# Patient Record
Sex: FEMALE | Race: WHITE
[De-identification: names, ages, dates, MRNs, and addresses within clinical notes are randomized per-mention and may not be internally consistent; named-entity substitution may affect disease eponyms.]

---

## 2018-05-23 ENCOUNTER — HOSPITAL ENCOUNTER (OUTPATIENT)
Dept: HOSPITAL 75 - RAD | Age: 48
End: 2018-05-23
Attending: NURSE PRACTITIONER
Payer: COMMERCIAL

## 2018-05-23 DIAGNOSIS — Z53.8: Primary | ICD-10-CM

## 2018-05-23 DIAGNOSIS — R53.83: ICD-10-CM

## 2018-05-26 ENCOUNTER — HOSPITAL ENCOUNTER (OUTPATIENT)
Dept: HOSPITAL 75 - RAD | Age: 48
End: 2018-05-26
Attending: NURSE PRACTITIONER
Payer: COMMERCIAL

## 2018-05-26 DIAGNOSIS — F80.81: Primary | ICD-10-CM

## 2018-05-26 DIAGNOSIS — R51: ICD-10-CM

## 2018-05-26 DIAGNOSIS — R53.83: ICD-10-CM

## 2018-05-26 PROCEDURE — 70553 MRI BRAIN STEM W/O & W/DYE: CPT

## 2018-05-26 RX ADMIN — GADOBUTROL ONE MMOL: 604.72 INJECTION INTRAVENOUS at 14:38

## 2018-05-26 NOTE — DIAGNOSTIC IMAGING REPORT
PROCEDURE: MR imaging of the brain with and without contrast.



TECHNIQUE: Multiplanar, multisequence MR imaging of the brain was

performed with and without contrast.



INDICATION: Stuttering. Chronic headaches. 



COMPARISON: None.



FINDINGS: Examination limited by motion artifact. No abnormal

intracranial signal, enhancement, restricted water diffusion or

hemosiderin deposition. Partially empty sella. Morphology is

otherwise unremarkable including the major midline structures,

posterior fossa and cerebellar pontine angle. The orbits are

negative on this nondedicated exam. No hydrocephalus or

extra-axial fluid collections. Normal intracranial flow voids.

The paranasal sinuses and mastoids are clear. Normal bone marrow

signal.



IMPRESSION: 

1. Examination limited by motion artifact.

2. Normal MRI of the brain without and with IV contrast.



Dictated by: 



  Dictated on workstation # [id number]

## 2018-09-06 ENCOUNTER — HOSPITAL ENCOUNTER (OUTPATIENT)
Dept: HOSPITAL 75 - RAD | Age: 48
End: 2018-09-06
Attending: NURSE PRACTITIONER
Payer: COMMERCIAL

## 2018-09-06 DIAGNOSIS — M81.0: Primary | ICD-10-CM

## 2018-09-06 DIAGNOSIS — M85.88: ICD-10-CM

## 2018-09-06 PROCEDURE — 77080 DXA BONE DENSITY AXIAL: CPT

## 2018-09-06 NOTE — DIAGNOSTIC IMAGING REPORT
EXAM: DEXA scan



INDICATION: Screening for osteoporosis



COMPARISON: There are no prior studies available for comparison.



FINDINGS:  

The bone mineral density in the hips and spine was measured.



The T score for the spine is -1.1. This does indicate mild

osteopenia.



The T score for the left hip is 0.1 and for the right hip -0.4.

These values are within normal limits.



IMPRESSION:

The bone mineral density in the hips is within normal limits but

there is mild osteopenia of the spine. 



Dictated by: 



  Dictated on workstation # [id number]

## 2018-10-08 ENCOUNTER — HOSPITAL ENCOUNTER (EMERGENCY)
Dept: HOSPITAL 75 - ER | Age: 48
Discharge: HOME | End: 2018-10-08
Payer: COMMERCIAL

## 2018-10-08 VITALS — DIASTOLIC BLOOD PRESSURE: 80 MMHG | SYSTOLIC BLOOD PRESSURE: 124 MMHG

## 2018-10-08 VITALS — BODY MASS INDEX: 39.27 KG/M2 | WEIGHT: 200 LBS | HEIGHT: 60 IN

## 2018-10-08 DIAGNOSIS — I48.91: ICD-10-CM

## 2018-10-08 DIAGNOSIS — K29.70: Primary | ICD-10-CM

## 2018-10-08 DIAGNOSIS — Z98.890: ICD-10-CM

## 2018-10-08 DIAGNOSIS — Z91.14: ICD-10-CM

## 2018-10-08 DIAGNOSIS — Z87.448: ICD-10-CM

## 2018-10-08 DIAGNOSIS — K21.9: ICD-10-CM

## 2018-10-08 DIAGNOSIS — Z90.710: ICD-10-CM

## 2018-10-08 LAB
ALBUMIN SERPL-MCNC: 4.3 GM/DL (ref 3.2–4.5)
ALP SERPL-CCNC: 51 U/L (ref 40–136)
ALT SERPL-CCNC: 26 U/L (ref 0–55)
BASOPHILS # BLD AUTO: 0.1 10^3/UL (ref 0–0.1)
BASOPHILS NFR BLD AUTO: 1 % (ref 0–10)
BILIRUB SERPL-MCNC: 0.4 MG/DL (ref 0.1–1)
BUN/CREAT SERPL: 8
CALCIUM SERPL-MCNC: 9.5 MG/DL (ref 8.5–10.1)
CHLORIDE SERPL-SCNC: 102 MMOL/L (ref 98–107)
CO2 SERPL-SCNC: 22 MMOL/L (ref 21–32)
CREAT SERPL-MCNC: 0.75 MG/DL (ref 0.6–1.3)
EOSINOPHIL # BLD AUTO: 0.6 10^3/UL (ref 0–0.3)
EOSINOPHIL NFR BLD AUTO: 5 % (ref 0–10)
ERYTHROCYTE [DISTWIDTH] IN BLOOD BY AUTOMATED COUNT: 15.7 % (ref 10–14.5)
GFR SERPLBLD BASED ON 1.73 SQ M-ARVRAT: > 60 ML/MIN
GLUCOSE SERPL-MCNC: 96 MG/DL (ref 70–105)
HCT VFR BLD CALC: 45 % (ref 35–52)
HGB BLD-MCNC: 15.4 G/DL (ref 11.5–16)
LIPASE SERPL-CCNC: 7 U/L (ref 8–78)
LYMPHOCYTES # BLD AUTO: 3.3 X 10^3 (ref 1–4)
LYMPHOCYTES NFR BLD AUTO: 25 % (ref 12–44)
MAGNESIUM SERPL-MCNC: 2.6 MG/DL (ref 1.8–2.4)
MANUAL DIFFERENTIAL PERFORMED BLD QL: NO
MCH RBC QN AUTO: 27 PG (ref 25–34)
MCHC RBC AUTO-ENTMCNC: 34 G/DL (ref 32–36)
MCV RBC AUTO: 80 FL (ref 80–99)
MONOCYTES # BLD AUTO: 1 X 10^3 (ref 0–1)
MONOCYTES NFR BLD AUTO: 7 % (ref 0–12)
NEUTROPHILS # BLD AUTO: 8 X 10^3 (ref 1.8–7.8)
NEUTROPHILS NFR BLD AUTO: 62 % (ref 42–75)
PLATELET # BLD: 409 10^3/UL (ref 130–400)
PMV BLD AUTO: 10.6 FL (ref 7.4–10.4)
POTASSIUM SERPL-SCNC: 4.2 MMOL/L (ref 3.6–5)
PROT SERPL-MCNC: 7.8 GM/DL (ref 6.4–8.2)
RBC # BLD AUTO: 5.65 10^6/UL (ref 4.35–5.85)
SODIUM SERPL-SCNC: 136 MMOL/L (ref 135–145)
WBC # BLD AUTO: 12.9 10^3/UL (ref 4.3–11)

## 2018-10-08 PROCEDURE — 83735 ASSAY OF MAGNESIUM: CPT

## 2018-10-08 PROCEDURE — 85025 COMPLETE CBC W/AUTO DIFF WBC: CPT

## 2018-10-08 PROCEDURE — 83690 ASSAY OF LIPASE: CPT

## 2018-10-08 PROCEDURE — 74177 CT ABD & PELVIS W/CONTRAST: CPT

## 2018-10-08 PROCEDURE — 80053 COMPREHEN METABOLIC PANEL: CPT

## 2018-10-08 PROCEDURE — 36415 COLL VENOUS BLD VENIPUNCTURE: CPT

## 2018-10-08 NOTE — XMS REPORT
Patient Summary (HL7 CCD)

 Created on: 01/10/2017



GUANACO CHEN

External Reference #: [id number]

: 1970

Sex: Female



Demographics







 Address  [address]

[place]  [address]

 

 Home Phone  [phone]

 

 Preferred Language  English

 

 Marital Status  Unknown

 

 Worship Affiliation  Unknown

 

 Race  White

 

 Ethnic Group  Not  or 





Author







 Author  NOEL KUMAR  Unknown

 

 Address  [address]

[place], [place]  [id number]



 

 Phone  [phone]







Care Team Providers







 Care Team Member Name  Role  Phone

 

 CHINO OTOOLE MD  Attphys  [phone]

 

 CHINO OTOOLE MD  Prisurg  [phone]



                                            



Vital Signs

          Unknown or Not Available.                                            
                        



Allergies

          Unknown or Not Available.                                            
                                  



Procedures

          





 Procedure        Code        Procedure Type        Date    

 

 CX CHEST 1 VIEW        006874118        SNOMED CT        2016    

 

 TSH        37187171        SNOMED CT        2016    

 

 BNP        110813996        SNOMED CT        2016    

 

 TROPONIN-I ADV        660848505        SNOMED CT        2016    

 

 COMPREHENSIVE METABOLIC PANEL        474333755        SNOMED CT        2016    

 

 CBC W/ AUTO DIFF (RFLX MAN DIFF IF IND)        7535890        SNOMED CT        
2016    

 

 ^CBC W/AUTO DIFF        0195181        SNOMED CT        2016    



                                                                               
                                                           



History of Immunizations

          Unknown or Not Available.                                            
                                  



Problems

          Unknown or Not Available.                                            
                                            



Results

          







 COMPREHENSIVE METABOLIC PANEL - Collect Date/Time: 2016 01:35     

 

 Test Name        Code        Test Result        Test Units        Test Ref 
Range    

 

 GLUCOSE        2345-7        161        MG/DL        L=70       H=100    

 

 SODIUM        2951-2        142        MEQ/L        L=135      H=148    

 

 POTASSIUM        2823-3        3.6        MEQ/L        L=3.5      H=5.3    

 

 CHLORIDE        2075-0        108        MEQ/L        L=96       H=110    

 

 CO2        2028-9        21        MEQ/L        L=22       H=29    

 

 BUN        3094-0        11        MG/DL        L=8        H=22    

 

 CREATININE        2160-0        0.8        MG/DL        L=0.6      H=1.6    

 

 SGOT/AST        1920-8        16        IU/L        L=10       H=40    

 

 SGPT/ALT        1742-6        28        IU/L        L=8        H=54    

 

 ALK PHOS        6768-6        69        IU/L        L=35       H=115    

 

 TOTAL PROTEIN        2885-2        6.7        G/DL        L=5.5      H=8.5    

 

 ALBUMIN        1751-7        4.1        G/DL        L=3.1      H=5.4    

 

 TOTAL BILI        1975-2        0.2        MG/DL        L=0.0      H=1.5    

 

 CALCIUM        90519-6        9.1        MG/DL        L=8.2      H=10.6    

 

 AGE                 46        yrs             

 

 GFR NonAA                 78                      

 

 GFR AA                 95                      

 

 eGFR                 >60        N/A             

 

 eGFR AA*                 >60        N/A             











 CBC W/ AUTO DIFF (RFLX MAN DIFF IF IND) - Collect Date/Time: 2016 01:35 
    

 

 Test Name        Code        Test Result        Test Units        Test Ref 
Range    

 

 WBC        04138-7        10.1        TH/CMM        L=4.5      H=10.8    

 

 RBC        789-8        5.08        ML/CMM        L=4.20     H=5.40    

 

 HGB        718-7        15.0        G/DL        L=12.0     H=16.0    

 

 HCT        4544-3        44.9        %        L=37.0     H=47.0    

 

 MCV                 88        FL        L=81       H=99    

 

 MCH                 29.5        PG        L=27.0     H=33.0    

 

 MCHC                 33.4        G/DL        L=31.0     H=36.0    

 

 RDW SD                 42        FL        L=36       H=50    

 

 RDW CV                 12.9        %        L=0.0      H=14.8    

 

 MPV                 10.1        FL        L=9.3      H=12.5    

 

 PLT        777-3        288        TH/CMM        L=130      H=440    

 

 NRBC#                 0.00        TH/CMM        L=0.00     H=0.00    

 

 NRBC%                 0.0        /100WBC        L=0.0      H=2.0    

 

 %NEUT                 50.5        %             

 

 %LYMP                 39.6        %             

 

 %MONO                 6.6        %             

 

 %EOS                 2.5        %             

 

 %BASO                 0.5        %             

 

 #NEUT                 5.09        TH/CMM        L=2.10     H=8.20    

 

 #LYMP                 3.98        TH/CMM        L=0.90     H=5.20    

 

 #MONO                 0.66        TH/CMM        L=0.16     H=1.00    

 

 #EOS                 0.25        TH/CMM        L=0.00     H=0.80    

 

 #BASO                 0.05        TH/CMM        L=0.00     H=0.20    

 

 MANUAL DIFF                 NOT IND        N/A             











 BNP - Collect Date/Time: 2016 01:35     

 

 Test Name        Code        Test Result        Test Units        Test Ref 
Range    

 

 BNP        93473-3        41        PG/ML        L=0        H=100    











 TROPONIN-I ADV - Collect Date/Time: 2016 01:35     

 

 Test Name        Code        Test Result        Test Units        Test Ref 
Range    

 

 TROPONIN-I AD        57838-9        <0.04        ng/mL        L=0.04     H=
0.40    











 TSH - Collect Date/Time: 2016 01:35     

 

 Test Name        Code        Test Result        Test Units        Test Ref 
Range    

 

 TSH        91609-2        1.69        mIU/L        L=0.35     H=4.94    



                                                                               
                                                 



Active Medications

          Unknown or Not Available.                                            
                        



Medications Administered During Visit

          Unknown or Not Available.                                            
                                  



Encounters

          





 Encounter Diagnosis        Diagnosis Code        Start Date    

 

 Paroxysmal atrial fibrillation         [id number]        2016    



                                                                    



Social History

          





 Smoking Status        Code        Start Date        End Date    

 

 Current some day smoker        [id number]                      



                                                                    



Patient Decision Aids

          Unknown or Not Available.                                            
              



Discharge Instructions

          







         



You were admitted to        [facility] on 2016 01:05 with a principal 
diagnosis of Paroxysmal atrial fibrillation        



You had the following tests done:                  BNP          CBC W/ AUTO 
DIFF (RFLX MAN DIFF IF IND)          COMPREHENSIVE METABOLIC PANEL          
TROPONIN-I ADV          TSH                



You were discharged from        [facility] on 2016 03:18        



Should you have any questions prior to discharge, please contact a member of 
your healthcare team. If you have left the hospital and have any questions, 
please contact your primary care physician.          



                                                          



Chief Complaint and Reason For Visit

          





 Chief Complaint        Date of Onset    

 

 PALPITATIONS             



                                                          



Function Status

          Unknown or Not Available.                                            
              



Plan of Care

          Unknown or Not Available.                                            
              



Referral/Transition of Care

          Unknown or Not Available.

## 2018-10-08 NOTE — XMS REPORT
Patient Summary (HL7 CCD)

 Created on: 2017



GUANACO DEVINE

External Reference #: [id number]

: 1970

Sex: Female



Demographics







 Address  [address]

[place]  [address]

 

 Home Phone  [phone]

 

 Preferred Language  Unknown

 

 Marital Status  Unknown

 

 Worship Affiliation  Unknown

 

 Race  White

 

 Ethnic Group  Not  or 





Author







 Author  NOEL KUMAR  Unknown

 

 Address  [address]

[place]  [address]



 

 Phone  [phone]







Care Team Providers







 Care Team Member Name  Role  Phone

 

 KRUSE, COLTON DO  Attphys  [phone]

 

 KRUSE, COLTON DO  Prisurg  [phone]



                                                                



Allergies

          Unknown or Not Available.                                            
                                            



Active Medications

          Unknown or Not Available.                                            
                                            



Problems

          Unknown or Not Available.                                            
                                            



Procedures

          





 Procedure        Code        Procedure Type        Date    

 

 CT ABD AND PELVIS W/O CONTRAST        946652848        SNOMED CT        2017    

 

 CULTURE BLOOD        84837838        SNOMED CT        2017    

 

 LACTIC ACID        1499023        SNOMED CT        2017    

 

 C REACTIVE PROTEIN        98866110        SNOMED CT        2017    

 

 UA ROUTINE C&S IF IND        695924050        SNOMED CT        2017    

 

 COMPREHENSIVE METABOLIC PANEL        251392145        SNOMED CT        2017    

 

 CBC W/ AUTO DIFF (RFLX MAN DIFF IF IND)        3062758        SNOMED CT        
2017    

 

 ^UA WITH MICRO        753608721        SNOMED CT        2017    

 

 ^CBC W/ MANUAL DIFF        73173247        SNOMED CT        2017    



                                                                               
                                                                               
          



Results

          







 COMPREHENSIVE METABOLIC PANEL - Collect Date/Time: 2017 17:25     

 

 Test Name        Code        Test Result        Test Units        Test Ref 
Range    

 

 GLUCOSE        2345-7        124        MG/DL        L=70       H=100    

 

 SODIUM        2951-2        139        MEQ/L        L=135      H=148    

 

 POTASSIUM        2823-3        4.1        MEQ/L        L=3.5      H=5.3    

 

 CHLORIDE        2075-0        104        MEQ/L        L=96       H=110    

 

 CO2        2028-9        23        MEQ/L        L=22       H=29    

 

 BUN        3094-0        8        MG/DL        L=8        H=22    

 

 CREATININE        2160-0        0.8        MG/DL        L=0.6      H=1.6    

 

 SGOT/AST        1920-8        33        IU/L        L=10       H=40    

 

 SGPT/ALT        1742-6        50        IU/L        L=8        H=54    

 

 ALK PHOS        6768-6        62        IU/L        L=35       H=115    

 

 TOTAL PROTEIN        2885-2        7.3        G/DL        L=5.5      H=8.5    

 

 ALBUMIN        1751-7        4.2        G/DL        L=3.1      H=5.4    

 

 TOTAL BILI        1975-2        0.3        MG/DL        L=0.0      H=1.5    

 

 CALCIUM        16214-9        9.1        MG/DL        L=8.2      H=10.6    

 

 AGE                 46        yrs             

 

 GFR NonAA                 78                      

 

 GFR AA                 95                      

 

 eGFR                 >60        N/A             

 

 eGFR AA*                 >60        N/A             











 CBC W/ AUTO DIFF (RFLX MAN DIFF IF IND) - Collect Date/Time: 2017 17:25 
    

 

 Test Name        Code        Test Result        Test Units        Test Ref 
Range    

 

 WBC        44054-0        5.1        TH/CMM        L=4.5      H=10.8    

 

 RBC        789-8        5.29        ML/CMM        L=4.20     H=5.40    

 

 HGB        718-7        15.3        G/DL        L=12.0     H=16.0    

 

 HCT        4544-3        46.5        %        L=37.0     H=47.0    

 

 MCV                 88        FL        L=81       H=99    

 

 MCH                 28.9        PG        L=27.0     H=33.0    

 

 MCHC                 32.9        G/DL        L=31.0     H=36.0    

 

 RDW SD                 42        FL        L=36       H=50    

 

 RDW CV                 13.1        %        L=0.0      H=14.8    

 

 MPV                 9.6        FL        L=9.3      H=12.5    

 

 PLT        777-3        197        TH/CMM        L=130      H=440    

 

 NRBC#                 0.00        TH/CMM        L=0.00     H=0.00    

 

 NRBC%                 0.0        /100WBC        L=0.0      H=2.0    

 

 %NEUT                 71.8        %             

 

 %LYMP                 18.3        %             

 

 %MONO                 8.9        %             

 

 %EOS                 0.4        %             

 

 %BASO                 0.4        %             

 

 #NEUT                 3.64        TH/CMM        L=2.10     H=8.20    

 

 #LYMP                 0.93        TH/CMM        L=0.90     H=5.20    

 

 #MONO                 0.45        TH/CMM        L=0.16     H=1.00    

 

 #EOS                 0.02        TH/CMM        L=0.00     H=0.80    

 

 #BASO                 0.02        TH/CMM        L=0.00     H=0.20    

 

 SEGS                 53        %             

 

 BANDS                 14        %             

 

 LYMPHS                 23        %             

 

 MONOS                 10        %             

 

 MANUAL DIFF                 SEE BELOW        N/A             











 UA ROUTINE C&S IF IND - Collect Date/Time: 2017 19:08     

 

 Test Name        Code        Test Result        Test Units        Test Ref 
Range    

 

 COLOR                 YELLOW        N/A        NL: YELLOW    

 

 APPEARANCE                 CLEAR        N/A        NL: CLEAR    

 

 SPEC GRAV                 1.015        N/A        NL: 1.002 - 1.022    

 

 pH                 8.0        N/A        NL: 5 - 9    

 

 PROTEIN                 NEGATIVE        N/A        NL: NEGATIVE  mg/dl    

 

 GLUCOSE                 NEGATIVE        N/A        NL: NEGATIVE  mg/dl    

 

 KETONE                 NEGATIVE        N/A        NL: NEGATIVE  mg/dl    

 

 BILIRUBIN                 NEGATIVE        N/A        NL: NEGATIVE    

 

 BLOOD                 SMALL        N/A        NL: NEGATIVE    

 

 NITRITE                 NEGATIVE        N/A        NL: NEGATIVE    

 

 LEUK SCREEN                 NEGATIVE        N/A        NL: NEGATIVE    

 

 MICRO INDICATED?                 SEE BELOW        N/A             

 

 WBC/HPF                 RARE        N/A        NL:  NEGATIVE    

 

 RBC/HPF                 10-20        N/A        NL:  NEGATIVE    

 

 CASTS/LPF                 NEGATIVE        N/A        NL:  NEGATIVE    

 

 CRYSTALS                 NEGATIVE        N/A        NL:  NEGATIVE    

 

 MUCOUS THRDS                 FEW        N/A        NL:  NEGATIVE    

 

 BACTERIA                 FEW        N/A        NL:  NEGATIVE    

 

 EPITH CELLS                 FEW SQUAMOUS        N/A        NL:  NEGATIVE    

 

 TRICHOMONAS                 NEGATIVE        N/A        NL:  NEGATIVE    

 

 YEAST                 NEGATIVE        N/A        NL:  NEGATIVE    

 

 CULT SET UP?                 NO        N/A             











 TICK PANEL - Collect Date/Time: 2017 17:25     

 

 Test Name        Code        Test Result        Test Units        Test Ref 
Range    

 

 Lyme IgG/IgM Ab        59093-2        <0.91        ISR        0.00-0.90    

 

 Lyme Disease Ab, Quant,IgM        5064-1        <0.80        index        0.00-
0.79    

 

 Octavio Mtn Spotted Fever,IgM        06081-2        0.27        index        0.00
-0.89    

 

 RMSF, IgG, EIA        41476-3        Negative        N/A        Negative    

 

 E. chaffeensis (HME) IgGTiter        82861-6        Negative        N/A        
Neg:<1:64    

 

 E. chaffeensis (HME) IgMTiter        66212-5        Negative        N/A        
Neg:<1:20    











 C REACTIVE PROTEIN - Collect Date/Time: 2017 17:25     

 

 Test Name        Code        Test Result        Test Units        Test Ref 
Range    

 

 C REACTIVE PROTEIN        -        4.8        MG/DL        L=0.0      H=
1.0    











 LACTIC ACID - Collect Date/Time: 2017 17:25     

 

 Test Name        Code        Test Result        Test Units        Test Ref 
Range    

 

 LACTIC ACID        2524-7        1.2        mmol/L        L=0.5      H=1.6    



                                                                               
                                                 



Function Status

          Unknown or Not Available.                                            
                                  



History of Immunizations

          Unknown or Not Available.                                            
                        



Plan of Treatment

          Unknown or Not Available.                                            
                        



Social History

          





 Smoking Status        Code        Start Date        End Date    

 

 Current some day smoker        [id number]                      



                                                                               
         



Vital Signs

          Unknown or Not Available.                                            
                        



Function Status

          Unknown or Not Available.                                            
    



Goals

          Unknown or Not Available.                                            
              



ASSESSMENTS

          Unknown or Not Available.                                            
                        



Health Concerns Section

          Unknown or Not Available.

## 2018-10-08 NOTE — XMS REPORT
Patient Summary (HL7 CCD)

 Created on: 2017



GUANACO CHEN

External Reference #: [id number]

: 1970

Sex: Female



Demographics







 Address  [address]

[place]  [address]

 

 Home Phone  [phone]

 

 Preferred Language  Unknown

 

 Marital Status  Unknown

 

 Gnosticism Affiliation  Unknown

 

 Race  White

 

 Ethnic Group  Not  or 





Author







 Author  DEV NAVARRETE

 

 Organization  Unknown

 

 Address  [address]

[place]  [address]



 

 Phone  [phone]







Care Team Providers







 Care Team Member Name  Role  Phone

 

 COLTON KRUSE   Attphys  [phone]

 

 CHINO OTOOLE MD  Prisurg  [phone]



                                                                



Allergies

          Unknown or Not Available.                                            
                                            



Active Medications

          Unknown or Not Available.                                            
                                            



Problems

          Unknown or Not Available.                                            
                                            



Procedures

          





 Procedure        Code        Procedure Type        Date    

 

 CT ABD AND PELVIS W/O CONTRAST        [id number]        [facility] CT        09/10/
2016    

 

 ABDOMEN ONE VIEW        [id number]        [facility] CT        09/10/2016    

 

 CX CHEST 1 VIEW        [id number]        [facility]        09/10/2016    

 

 C REACTIVE PROTEIN        [id number]        [facility] CT        09/10/2016    

 

 CULTURE BLOOD        02647182        [facility] CT        09/10/2016    

 

 LACTIC ACID        1383157        [facility]OM[facility] CT        09/10/2016    

 

 MAGNESIUM        619977248        SNOMED CT        09/10/2016    

 

 UA ROUTINE C&S IF IND        [id number]        SNOMED CT        09/10/2016    

 

 TROPONIN-I ADV        [id number]        [facility] CT        09/10/2016    

 

 COMPREHENSIVE METABOLIC PANEL        252731801        SNOMED CT        09/10/
2016    

 

 CBC W/ AUTO DIFF (RFLX MAN DIFF IF IND)        0648122        SNOMED CT        
09/10/2016    

 

 ^UA WITH MICRO        203447039        SNOMED CT        09/10/2016    

 

 ^CBC W/AUTO DIFF        6405064        SNOMED CT        09/10/2016    



                                                                               
                                                                               
                                                  



Results

          







 COMPREHENSIVE METABOLIC PANEL - Collect Date/Time: 09/10/2016 19:05     

 

 Test Name        Code        Test Result        Test Units        Test Ref 
Range    

 

 GLUCOSE        2345-7        141        MG/DL        L=70       H=100    

 

 SODIUM        2951-2        141        MEQ/L        L=135      H=148    

 

 POTASSIUM        2823-3        3.4        MEQ/L        L=3.5      H=5.3    

 

 CHLORIDE        2075-0        108        MEQ/L        L=96       H=110    

 

 CO2        2028-9        20        MEQ/L        L=22       H=29    

 

 BUN        3094-0        14        MG/DL        L=8        H=22    

 

 CREATININE        2160-0        0.8        MG/DL        L=0.6      H=1.6    

 

 SGOT/AST        1920-8        18        IU/L        L=10       H=40    

 

 SGPT/ALT        1742-6        28        IU/L        L=8        H=54    

 

 ALK PHOS        6768-6        68        IU/L        L=35       H=115    

 

 TOTAL PROTEIN        2885-2        6.8        G/DL        L=5.5      H=8.5    

 

 ALBUMIN        1751-7        4.1        G/DL        L=3.1      H=5.4    

 

 TOTAL BILI        1975-2        0.4        MG/DL        L=0.0      H=1.5    

 

 CALCIUM        88618-8        9.2        MG/DL        L=8.2      H=10.6    

 

 AGE                 46        yrs             

 

 GFR NonAA                 78                      

 

 GFR AA                 95                      

 

 eGFR                 >60        N/A             

 

 eGFR AA*                 >60        N/A             











 MAGNESIUM - Collect Date/Time: 09/10/2016 19:05     

 

 Test Name        Code        Test Result        Test Units        Test Ref 
Range    

 

 MAGNESIUM        55517-0        2.1        MG/DL        L=1.7      H=2.8    











 CBC W/ AUTO DIFF (RFLX MAN DIFF IF IND) - Collect Date/Time: 09/10/2016 19:05 
    

 

 Test Name        Code        Test Result        Test Units        Test Ref 
Range    

 

 WBC        56564-3        12.0        TH/CMM        L=4.5      H=10.8    

 

 RBC        789-8        4.99        ML/CMM        L=4.20     H=5.40    

 

 HGB        718-7        14.6        G/DL        L=12.0     H=16.0    

 

 HCT        4544-3        43.8        %        L=37.0     H=47.0    

 

 MCV                 88        FL        L=81       H=99    

 

 MCH                 29.3        PG        L=27.0     H=33.0    

 

 MCHC                 33.3        G/DL        L=31.0     H=36.0    

 

 RDW SD                 42        FL        L=36       H=50    

 

 RDW CV                 13.1        %        L=0.0      H=14.8    

 

 MPV                 10.4        FL        L=9.3      H=12.5    

 

 PLT        777-3        290        TH/CMM        L=130      H=440    

 

 NRBC#                 0.00        TH/CMM        L=0.00     H=0.00    

 

 NRBC%                 0.0        /100WBC        L=0.0      H=2.0    

 

 %NEUT                 60.3        %             

 

 %LYMP                 30.4        %             

 

 %MONO                 6.8        %             

 

 %EOS                 1.8        %             

 

 %BASO                 0.4        %             

 

 #NEUT                 7.24        TH/CMM        L=2.10     H=8.20    

 

 #LYMP                 3.65        TH/CMM        L=0.90     H=5.20    

 

 #MONO                 0.81        TH/CMM        L=0.16     H=1.00    

 

 #EOS                 0.22        TH/CMM        L=0.00     H=0.80    

 

 #BASO                 0.05        TH/CMM        L=0.00     H=0.20    

 

 MANUAL DIFF                 NOT IND        N/A             











 UA ROUTINE C&S IF IND - Collect Date/Time: 09/10/2016 19:30     

 

 Test Name        Code        Test Result        Test Units        Test Ref 
Range    

 

 COLOR                 YELLOW        N/A        NL: YELLOW    

 

 APPEARANCE                 CLEAR        N/A        NL: CLEAR    

 

 SPEC GRAV                 >=1.030        N/A        NL: 1.002 - 1.022    

 

 pH                 6.0        N/A        NL: 5 - 9    

 

 PROTEIN                 NEGATIVE        N/A        NL: NEGATIVE  mg/dl    

 

 GLUCOSE                 NEGATIVE        N/A        NL: NEGATIVE  mg/dl    

 

 KETONE                 TRACE        N/A        NL: NEGATIVE  mg/dl    

 

 BILIRUBIN                 NEGATIVE        N/A        NL: NEGATIVE    

 

 BLOOD                 MODERATE        N/A        NL: NEGATIVE    

 

 NITRITE                 NEGATIVE        N/A        NL: NEGATIVE    

 

 LEUK SCREEN                 NEGATIVE        N/A        NL: NEGATIVE    

 

 MICRO INDICATED?                 SEE BELOW        N/A             

 

 WBC/HPF                 RARE        N/A        NL:  NEGATIVE    

 

 RBC/HPF                 0-5        N/A        NL:  NEGATIVE    

 

 CASTS/LPF                 1+ HYALINE        N/A        NL:  NEGATIVE    

 

 CRYSTALS                 NEGATIVE        N/A        NL:  NEGATIVE    

 

 MUCOUS THRDS                 1+        N/A        NL:  NEGATIVE    

 

 BACTERIA                 FEW        N/A        NL:  NEGATIVE    

 

 EPITH CELLS                 FEW SQUAMOUS        N/A        NL:  NEGATIVE    

 

 TRICHOMONAS                 NEGATIVE        N/A        NL:  NEGATIVE    

 

 YEAST                 NEGATIVE        N/A        NL:  NEGATIVE    

 

 CULT SET UP?                 NO        N/A             











 C REACTIVE PROTEIN - Collect Date/Time: 09/10/2016 19:05     

 

 Test Name        Code        Test Result        Test Units        Test Ref 
Range    

 

 C REACTIVE PROTEIN        1988-5        2.0        MG/DL        L=0.0      H=
1.0    











 TROPONIN-I ADV - Collect Date/Time: 09/10/2016 19:05     

 

 Test Name        Code        Test Result        Test Units        Test Ref 
Range    

 

 TROPONIN-I AD        16126-5        <0.04        ng/mL        L=0.04     H=
0.40    











 LACTIC ACID - Collect Date/Time: 09/10/2016 19:05     

 

 Test Name        Code        Test Result        Test Units        Test Ref 
Range    

 

 LACTIC ACID        2524-7        1.7        mmol/L        L=0.5      H=1.6    



                                                                               
                                                           



Function Status

          Unknown or Not Available.                                            
                                  



History of Immunizations

          Unknown or Not Available.                                            
                        



Plan of Treatment

          Unknown or Not Available.                                            
                        



Social History

          





 Smoking Status        Code        Start Date        End Date    

 

 Current some day smoker        [id number]                      



                                                                               
         



Vital Signs

          Unknown or Not Available.                                            
                        



Function Status

          Unknown or Not Available.                                            
    



Goals

          Unknown or Not Available.                                            
              



ASSESSMENTS

          Unknown or Not Available.                                            
                        



Health Concerns Section

          Unknown or Not Available.

## 2018-10-08 NOTE — DIAGNOSTIC IMAGING REPORT
INDICATION: Abdominal pain x3 weeks with vomiting and low-grade

fever.



CT of the abdomen and pelvis obtained with IV contrast bolus.

There is no prior study for comparison.



FINDINGS: The visualized portions of the lung bases are clear.

There were no pleural fluid collections. There is no free

intraperitoneal air.



The liver shows diffuse low-density change compatible with fatty

infiltration. There is no focal liver lesion. Gallbladder appears

unremarkable. The spleen, adrenals, and pancreas are normal in

appearance. Kidneys bilaterally are unremarkable. There is no

retroperitoneal mass or adenopathy. There is no ascites or

abnormal fluid collection. Visualized bowel loops show no bowel

wall thickening or obstruction. The appendix appears normal.

There is no pelvic mass or free fluid. Patient appears to have

had prior hysterectomy.



IMPRESSION:



No abdominal mass or abnormal fluid collection. Fatty

infiltration of the liver is noted. Evidence of previous

hysterectomy. No acute process is visualized in the abdomen or

pelvis.



Dictated by: 



  Dictated on workstation # [id number]

## 2018-10-08 NOTE — ED ABDOMINAL PAIN
General


Chief Complaint:  Abdominal/GI Problems


Stated Complaint:  N/V


Nursing Triage Note:  


p states three weeks ago she began to have foul smelling belching that 


progressed into pain after eating in the RUQ. today the pt began to develope 


nausea and vomiting with the pain. upon presentation denies nausea.


Sepsis Screen:  No Definite Risk


Source of Information:  Patient


Exam Limitations:  No Limitations





History of Present Illness


Date Seen by Provider:  Oct 8, 2018


Time Seen by Provider:  10:56


Initial Comments


The patient presents to the ER by private conveyance with her  and chief 

complaint that she's been having some abdominal pain in her epigastric 

radiating to her right upper quadrant off-and-on for the past 3-4 weeks. She 

says about 4-6 weeks ago she started taking a new injectable class of 

antihyperglycemic known as GLP1 receptor agonists and she is on her third 

brand. She did not like the style of injector on the second one in the first 

one she had some issues tolerating it. She did go see her doctor on Friday and 

they did labs and urine and told her everything looked okay. She's continued to 

have nausea vomiting and worsening pain and so she was instructed to go to the 

ER if this occurred. She says her blood sugars were getting better on the 

injectables but for the past 2 days they've been high as high as 160 and an 

hour later down to 60 with sweats, cool clammy feeling. She fixed by eating 

something. She also takes metformin twice daily. She has a history of C-sections

, hysterectomy and both of her ovaries removed for large cysts. She had her 

hysterectomy done for fibroid tumors. She also has endometriosis. She has not 

had any other abdominal surgeries. She's had no fevers but she's measured her 

temperature to be as high as 99.6. She's not having any nausea presently but 

her pain is about a 7 out of 10. She's been using tramadol for her pain. She 

does not feel that this helped much. She has had a colonoscopy but she does not 

member any mention of diverticulosis. She's never had an EGD. She does have a 

history of GERD and has been using omeprazole for years and her PCP started her 

on Carafate 4 days ago. She also states she has a history of atrial 

fibrillation and is not on any blood thinners but follows with the cardiologist 

that comes down and also be go from [facility].





Allergies and Home Medications


Allergies


Coded Allergies:  


     No Known Drug Allergies (Unverified , 18)





Home Medications


Ondansetron 4 Mg Tab.rapdis, 4 MG PO Q6H PRN for NAUSEA/VOMITING


   Prescribed by: LAVONNE KAMARA on 10/8/18 1778





Patient Home Medication List


Home Medication List Reviewed:  Yes





Review of Systems


Review of Systems


Constitutional:  No chills, No diaphoresis; malaise


EENTM:  No Blurred Vision, No Double Vision


Respiratory:  Denies Cough, Denies Shortness of Air


Cardiovascular:  Denies Chest Pain, Denies Lightheadedness


Gastrointestinal:  Denies Abdomen Distended; Abdominal Pain; Denies Constipated

, Denies Diarrhea; Nausea, Vomiting


Genitourinary:  Denies Burning, Denies Discharge


Musculoskeletal:  No back pain, No joint pain


Skin:  No pruritus, No rash


Psychiatric/Neurological:  Denies Headache, Denies Numbness





Past Medical-Social-Family Hx


Patient Social History


Alcohol Use:  Denies Use


Recreational Drug Use:  No


Smoking Status:  Never a Smoker


Recent Foreign Travel:  No


Contact w/Someone Who Travel:  No


Recent Infectious Disease Expo:  No





Past Medical History


Pregnant:  No


GYN History:  Hysterectomy





Physical Exam


Vital Signs





Vital Signs - First Documented








 10/8/18





 10:51


 


Temp 98.8


 


Pulse 88


 


Resp 20


 


B/P (MAP) 149/93 (111)





Capillary Refill : Less Than 3 Seconds


Height/Weight/BMI


Height: 5'0"


Weight: 200lbs. oz. 90.237509mw;  BMI


Method:Stated


General Appearance:  WD/WN, no apparent distress


HEENT:  PERRL/EOMI, pharynx normal


Neck:  non-tender, normal inspection


Respiratory:  chest non-tender, lungs clear, normal breath sounds, no 

respiratory distress, no accessory muscle use


Cardiovascular:  normal peripheral pulses, regular rate, rhythm, no edema


Gastrointestinal:  normal bowel sounds, guarding; No rebound; tenderness (

moderate to severe with Castellano sign)





Progress/Results/Core Measures


Results/Orders


Lab Results





Laboratory Tests








Test


 10/8/18


11:28 Range/Units


 


 


White Blood Count


 12.9 H


 4.3-11.0


10^3/uL


 


Red Blood Count


 5.65 


 4.35-5.85


10^6/uL


 


Hemoglobin 15.4  11.5-16.0  G/DL


 


Hematocrit 45  35-52  %


 


Mean Corpuscular Volume 80  80-99  FL


 


Mean Corpuscular Hemoglobin 27  25-34  PG


 


Mean Corpuscular Hemoglobin


Concent 34 


 32-36  G/DL





 


Red Cell Distribution Width 15.7 H 10.0-14.5  %


 


Platelet Count


 409 H


 130-400


10^3/uL


 


Mean Platelet Volume 10.6 H 7.4-10.4  FL


 


Neutrophils (%) (Auto) 62  42-75  %


 


Lymphocytes (%) (Auto) 25  12-44  %


 


Monocytes (%) (Auto) 7  0-12  %


 


Eosinophils (%) (Auto) 5  0-10  %


 


Basophils (%) (Auto) 1  0-10  %


 


Neutrophils # (Auto) 8.0 H 1.8-7.8  X 10^3


 


Lymphocytes # (Auto) 3.3  1.0-4.0  X 10^3


 


Monocytes # (Auto) 1.0  0.0-1.0  X 10^3


 


Eosinophils # (Auto)


 0.6 H


 0.0-0.3


10^3/uL


 


Basophils # (Auto)


 0.1 


 0.0-0.1


10^3/uL


 


Sodium Level 136  135-145  MMOL/L


 


Potassium Level 4.2  3.6-5.0  MMOL/L


 


Chloride Level 102    MMOL/L


 


Carbon Dioxide Level 22  21-32  MMOL/L


 


Anion Gap 12  5-14  MMOL/L


 


Blood Urea Nitrogen 6 L 7-18  MG/DL


 


Creatinine


 0.75 


 0.60-1.30


MG/DL


 


Estimat Glomerular Filtration


Rate > 60 


  





 


BUN/Creatinine Ratio 8   


 


Glucose Level 96    MG/DL


 


Calcium Level 9.5  8.5-10.1  MG/DL


 


Corrected Calcium 9.3  8.5-10.1  MG/DL


 


Magnesium Level 2.6 H 1.8-2.4  MG/DL


 


Total Bilirubin 0.4  0.1-1.0  MG/DL


 


Aspartate Amino Transf


(AST/SGOT) 20 


 5-34  U/L





 


Alanine Aminotransferase


(ALT/SGPT) 26 


 0-55  U/L





 


Alkaline Phosphatase 51    U/L


 


Total Protein 7.8  6.4-8.2  GM/DL


 


Albumin 4.3  3.2-4.5  GM/DL


 


Lipase 7 L 8-78  U/L








My Orders





Orders - ANH,LAVONNE J


Cbc With Automated Diff (10/8/18 11:06)


Comprehensive Metabolic Panel (10/8/18 11:06)


Lipase (10/8/18 11:06)


Magnesium (10/8/18 11:06)


Saline Lock/Iv-Start (10/8/18 11:06)


Ns Iv 1000 Ml (Sodium Chloride 0.9%) (10/8/18 11:06)


Ketorolac Injection (Toradol Injection) (10/8/18 11:15)


Ct Abdomen/Pelvis W (10/8/18 11:19)


Iohexol Injection (Omnipaque 350 Mg/Ml 1 (10/8/18 11:30)


Ns (Ivpb) (Sodium Chloride 0.9%) (10/8/18 11:30)


Ondansetron Injection (Zofran Injectio (10/8/18 12:45)


Lidocaine 2% Viscous 15 Ml (Xylocaine Vi (10/8/18 12:45)


Famotidine Tablet (Pepcid Tablet) (10/8/18 12:43)


Antacid  Suspension (Mylanta  Suspension (10/8/18 12:45)





Medications Given in ED





Current Medications








 Medications  Dose


 Ordered  Sig/Viet


 Route  Start Time


 Stop Time Status Last Admin


Dose Admin


 


 Al Hydrox/Mg


 Hydrox/Simethicone  30 ml  ONCE  ONCE


 PO  10/8/18 12:45


 10/8/18 12:46 DC 10/8/18 12:57


30 ML


 


 Ketorolac


 Tromethamine  15 mg  ONCE  ONCE


 IVP  10/8/18 11:15


 10/8/18 11:16 DC 10/8/18 11:39


15 MG


 


 Lidocaine HCl  15 ml  ONCE  ONCE


 PO  10/8/18 12:45


 10/8/18 12:46 DC 10/8/18 12:57


15 ML


 


 Ondansetron HCl  4 mg  ONCE  ONCE


 IVP  10/8/18 12:45


 10/8/18 12:46 DC 10/8/18 12:57


4 MG








Vital Signs/I&O











 10/8/18





 10:51


 


Temp 98.8


 


Pulse 88


 


Resp 20


 


B/P (MAP) 149/93 (111)














Blood Pressure Mean:  111











Progress


Progress Note #1:  


   Time:  11:14


Progress Note


IV fluids, labs included lipase and magnesium, Toradol for pain to start. She's 

declining anything for nausea right now. We have offered to do a UA but she 

says she just had checked Friday and is not having any symptoms. She does have 

right upper quadrant pains were in a start with a CT with contrast of her 

abdomen and pelvis to observe the gallbladder, pancreas and colon. She does not 

have any fever or tachycardia today. The other consideration would be that her 

symptoms started shortly after using the GLP1 agonists which are known to have 

pretty prominent GI symptoms similar to what she describes. Her symptoms are 

certainly after eating but they seemed to come right after eating as opposed to 

an hour later.


Progress Note #2:  


   Time:  12:32


Progress Note


CT imaging is unrevealing. Labs are unrevealing of any pathology. Her all 3 

cell lines are high or on the upper limit of normal which could be from her 

nausea vomiting or could be from dehydration however the BUNs is not elevated. 

Clinical exam she does look a little dry. We put some fluids and antinausea 

medicine and since is nothing acutely going on with recommend she follow up 

working up her gallbladder outpatient with her primary care doctor. Would also 

recommend some nausea medicine such as Zofran instead of Phenergan during the 

day and finally if nothing else was found acutely the primary care team could 

consider the GLP1 agonists. 


Her nausea has significantly improved but she still having a little bit sore 

give another round of Zofran and a GI cocktail to help us examine the esophagus 

and stomach lining see if this is causing her pain. She says her pain has 

improved significantly as well and is almost gone but still dull and aching 

about 2-3 out of 10.


Progress Note #3:  


   Time:  13:47


Progress Note


The patient's nausea is gone and she notes that the GI cocktail made her pain 

go away completely and nearly immediately. We will recommend that she follow up 

with the PCP to discuss setting up an EGD outpatient.





Diagnostic Imaging





   Diagonstic Imaging:  CT (with contrast)


   Plain Films/CT/US/NM/MRI:  abdomen, pelvis


Comments


NAME:   GUANACO DEVINE


[facility] REC#:   [id number]


ACCOUNT#:   [id number]


PT STATUS:   REG ER


:   1970


PHYSICIAN:   LAVONNE KAMARA MD


ADMIT DATE:   10/08/18/ER


 ***Draft***


Date of Exam:10/08/18





CT ABDOMEN/PELVIS W








INDICATION: Abdominal pain x3 weeks with vomiting and low-grade


fever.





CT of the abdomen and pelvis obtained with IV contrast bolus.


There is no prior study for comparison.





FINDINGS: The visualized portions of the lung bases are clear.


There were no pleural fluid collections. There is no free


intraperitoneal air.





The liver shows diffuse low-density change compatible with fatty


infiltration. There is no focal liver lesion. Gallbladder appears


unremarkable. The spleen, adrenals, and pancreas are normal in


appearance. Kidneys bilaterally are unremarkable. There is no


retroperitoneal mass or adenopathy. There is no ascites or


abnormal fluid collection. Visualized bowel loops show no bowel


wall thickening or obstruction. The appendix appears normal.


There is no pelvic mass or free fluid. Patient appears to have


had prior hysterectomy.





IMPRESSION:





No abdominal mass or abnormal fluid collection. Fatty


infiltration of the liver is noted. Evidence of previous


hysterectomy. No acute process is visualized in the abdomen or


pelvis.





  Dictated on workstation # [id number]








Dict:   10/08/18 1222


Trans:   10/08/18 1228


 [id number]





Interpreted by:     PARMJIT DUFFY MD


Electronically signed by:


   Reviewed:  Reviewed by Me





Departure


Impression





 Primary Impression:  


 Abdominal pain


 Qualified Codes:  R10.11 - Right upper quadrant pain


 Additional Impressions:  


 Nausea vomiting and diarrhea


 Gastritis


 Qualified Codes:  K29.00 - Acute gastritis without bleeding


Disposition:   HOME, SELF-CARE


Condition:  Improved





Departure-Patient Inst.


Decision time for Depature:  13:48


Referrals:  


CHASTITY,LOCAL PHYSICIAN (PCP)


Primary Care Physician








NIRANJAN CAROLINA (Family)


Primary Care Physician


Patient Instructions:  Gastritis (DC)





Add. Discharge Instructions:  


Continue to use your omeprazole and Carafate as prescribed. Take Zantac 1 

tablet twice a day for the next 2-4 weeks in addition. If you have nausea you 

can use Zofran 1 tablet every 6 hours as needed in addition to the Phenergan.


Get a follow-up appointment with your primary care doctor in the next 1-2 weeks 

to continue working up your symptoms with an EGD.


If your pain gets worse or you develop fevers above 102.5F or you have 

intractable nausea vomiting or other worrisome symptoms then you should return 

to the nearest ER for further evaluation.


All discharge instructions reviewed with patient and/or family. Voiced 

understanding.


Scripts


Ondansetron (Ondansetron Odt) 4 Mg Tab.rapdis


4 MG PO Q6H PRN for NAUSEA/VOMITING for 14 Days, #12 TAB 0 Refills


   Prov: LAVONNE KAMARA         10/8/18


Work/School Note:  Work Release Form   Date Seen in the Emergency Department:  

Oct 8, 2018


   Return to Work:  Oct 11, 2018


   Restrictions:  No Restrictions











LAVONNE KAMARA Oct 8, 2018 11:14

## 2018-10-22 ENCOUNTER — HOSPITAL ENCOUNTER (OUTPATIENT)
Dept: HOSPITAL 75 - PREOP | Age: 48
Discharge: HOME | End: 2018-10-22
Attending: SURGERY
Payer: COMMERCIAL

## 2018-10-22 VITALS — HEIGHT: 60 IN | WEIGHT: 200 LBS | BODY MASS INDEX: 39.27 KG/M2

## 2018-10-22 DIAGNOSIS — Z01.818: Primary | ICD-10-CM

## 2018-10-24 ENCOUNTER — HOSPITAL ENCOUNTER (OUTPATIENT)
Dept: HOSPITAL 75 - ENDO | Age: 48
Discharge: HOME | End: 2018-10-24
Attending: SURGERY
Payer: COMMERCIAL

## 2018-10-24 VITALS — DIASTOLIC BLOOD PRESSURE: 86 MMHG | SYSTOLIC BLOOD PRESSURE: 130 MMHG

## 2018-10-24 VITALS — DIASTOLIC BLOOD PRESSURE: 68 MMHG | SYSTOLIC BLOOD PRESSURE: 106 MMHG

## 2018-10-24 VITALS — SYSTOLIC BLOOD PRESSURE: 137 MMHG | DIASTOLIC BLOOD PRESSURE: 63 MMHG

## 2018-10-24 VITALS — BODY MASS INDEX: 39.27 KG/M2 | WEIGHT: 200 LBS | HEIGHT: 60 IN

## 2018-10-24 VITALS — SYSTOLIC BLOOD PRESSURE: 106 MMHG | DIASTOLIC BLOOD PRESSURE: 68 MMHG

## 2018-10-24 DIAGNOSIS — F32.9: ICD-10-CM

## 2018-10-24 DIAGNOSIS — K29.70: ICD-10-CM

## 2018-10-24 DIAGNOSIS — M79.7: ICD-10-CM

## 2018-10-24 DIAGNOSIS — F17.210: ICD-10-CM

## 2018-10-24 DIAGNOSIS — F41.9: ICD-10-CM

## 2018-10-24 DIAGNOSIS — R60.0: ICD-10-CM

## 2018-10-24 DIAGNOSIS — K29.80: ICD-10-CM

## 2018-10-24 DIAGNOSIS — J44.9: ICD-10-CM

## 2018-10-24 DIAGNOSIS — I10: ICD-10-CM

## 2018-10-24 DIAGNOSIS — Z98.1: ICD-10-CM

## 2018-10-24 DIAGNOSIS — K21.0: Primary | ICD-10-CM

## 2018-10-24 DIAGNOSIS — M32.9: ICD-10-CM

## 2018-10-24 DIAGNOSIS — K59.00: ICD-10-CM

## 2018-10-24 DIAGNOSIS — Z87.19: ICD-10-CM

## 2018-10-24 NOTE — PROGRESS NOTE-POST OPERATIVE
Post-Operative Progess Note


Surgeon (s)/Assistant (s)


Surgeon


JOELLE ROSE MD


Assistant:  NONE





Pre-Operative Diagnosis


GERD





Post-Operative Diagnosis





reflux esophagitis(stage 2), small HH(2cm), moderate gastritis and


duodenitis.





Procedure & Operative Findings


Date of Procedure


10/24/18


Procedure Performed/Findings


EGD with bx.


Anesthesia Type


CS





Estimated Blood Loss


Estimated blood loss (mL):  minimal





Specimens/Packing


Specimens Removed


GE jxn, antrum











JOELLE ROSE MD Oct 24, 2018 2:21 pm

## 2018-10-24 NOTE — OPERATIVE REPORT
DATE OF SERVICE:  10/24/2018



ATTENDING PRIMARY CLINICIAN:

VANDANA Johnston



PREOPERATIVE DIAGNOSES:

Epigastric pain with history of worsening gastroesophageal reflux disease.



POSTOPERATIVE DIAGNOSES:

Reflux esophagitis stage II, hiatal hernia approximately 2 cm in size, moderate

gastritis and mild duodenitis.



PROCEDURE:

EGD with biopsy.



SURGEON:

Joelle Rose MD



ANESTHESIA:

Conscious sedation.



ESTIMATED BLOOD LOSS:

Minimal.



FINDINGS:

Reflux esophagitis stage II, intrathoracic GE junction with hiatal hernia

identified approximately 2 cm in size consistent with a sliding type 1, moderate

gastritis, mild duodenitis.  No ulcerations, polyps or any neoplasms.



DISPOSITION:

The patient tolerated the procedure well.



INDICATIONS:

The patient is a 48-year-old female with a number of medical comorbidities

related to her weight.  Her medical comorbidities include gastroesophageal

reflux disease, fibromyalgia, anxiety, depression, hypertension and lower

extremity edema.  She has had issues with gastroesophageal reflux disease and

peptic ulcer disease since age 20.  She has been on different medications over

the years and has tried omeprazole and is currently on omeprazole 40 mg daily,

and was recently started on Zantac 150 mg b.i.d. as well.  She reports pain in

the epigastric region as well as right upper abdominal quadrant and does state

that some foods make this worse including greasy foods.  She was seen in the

Emergency Department where a CT scan was performed, which did not show any

intra-abdominal inflammatory changes.



DESCRIPTION OF PROCEDURE:

The patient was brought to the endoscopy suite, laid in the left lateral

decubitus position.  After adequate IV pain and sedating medications and

conscious sedation anesthesia, the mouthpiece was applied.



Endoscope was placed in the mouth, visualizing the pharynx and hypopharyngeal

region.  Vocal cords, epiglottis and vallecula identified and appeared to be

normal.  The endoscope was gently intubated at the esophageal opening and

esophagus insufflated.  The endoscope was then advanced to the first, second and

third portion of esophagus to the level of GE junction, a reflux esophagitis

stage II identified.  The GE junction was also intrathoracic consistent with a

type 1 sliding hiatal hernia.  This was also verified upon.



The endoscope was then advanced into the stomach and endoscope retroflexed,

visualizing a small hiatal hernia approximately 2 cm in size.  There was also

moderate severity gastritis.  No formal ulcerations, polyps or any neoplasms.  A

biopsy was taken of the stomach antrum with forceps with visualization of good

hemostasis.



The endoscope was then intubated into the pylorus and the first and second

portion of duodenum.  A mild to moderate duodenitis was also identified.  There

were no ulcers or any neoplasms.  A biopsy was taken of the duodenum with

visualization of good hemostasis.  The endoscope was slowly withdrawn while

taking a second look and suctioning residual air with no additional findings.



The patient tolerated the procedure well.  We will recommend the necessary

lifestyle and diet accommodation including small and more frequent meals,

avoidance of eating at night as well as head elevation while lying supine.  She

also needs to proceed with cessation of smoking.  We will also await the biopsy

results for possible H. pylori as well as some form of food allergy.  However,

we will proceed with replacing her omeprazole with pantoprazole 40 mg daily. 

Her symptoms may also be due to her gallbladder and she will undergo testing for

this as well.





Job ID: [id number]

DocumentID: [id number]

Dictated Date:  10/24/2018 12:57:11

Transcription Date: 10/24/2018 16:02:26

Dictated By: JOELLE ROSE MD

## 2018-10-24 NOTE — CONSCIOUS SEDATION/ASA
Conscious Sedation Pre-Proced


Time


11:00





ASA Score


2


For ASA 3 and 4: Consider anesthesia and medical clearance. Also, for 

patients with a history of failed moderate sedation consider anesthesia.

















Airway 


 


Lungs 


 


Heart 


 


 ASA score


 


 ASA 1: a normal healthy patient


 


 ASA 2:  a patient with a mild systemic disease (mid diabetes, controlled 

hypertension, obesity 


 


 ASA 3:  a patient with a severe systemic disease that limits activity  (angina

, COPD, prior Myocardial infarction)


 


 ASA 4:  a patient with an incapacitating disease that is a constant threat to 

life (CHF, renal failure)


 


 ASA 5:  a moribund patient not expected to survive 24 hrs.  (ruptured aneurysm)


 


 ASA 6:  a declared brain dead patient whose organs are being harvested.


 


 For emergent operations, add the letter E after the classification











Mallampati Classification


Grade 3





Sedation Plan


Analgesia, Amnesia, Plan communicated to team members, Discussed options with 

patient/fam, Discussed risks with patient/fam


The patient is an appropriate candidate to undergo the planned procedure, 

sedation, and anesthesia.





The patient immediately re-assessed prior to indication.











JOELLE ROSE MD Oct 24, 2018 11:18 am

## 2018-10-24 NOTE — PROGRESS NOTE-PRE OPERATIVE
Pre-Operative Progress Note


H&P Reviewed


The H&P was reviewed, patient examined and no changes noted.


Date Seen by Provider:  Oct 24, 2018


Time Seen by Provider:  11:00


Date H&P Reviewed:  Oct 24, 2018


Time H&P Reviewed:  11:00


Pre-Operative Diagnosis:  JOELLE VERA MD Oct 24, 2018 11:18 am

## 2018-10-30 ENCOUNTER — HOSPITAL ENCOUNTER (OUTPATIENT)
Dept: HOSPITAL 75 - PREOP | Age: 48
Discharge: HOME | End: 2018-10-30
Attending: SURGERY
Payer: COMMERCIAL

## 2018-10-30 VITALS — HEIGHT: 60 IN | BODY MASS INDEX: 39.27 KG/M2 | WEIGHT: 200 LBS

## 2018-10-30 DIAGNOSIS — Z01.818: Primary | ICD-10-CM

## 2018-10-31 ENCOUNTER — HOSPITAL ENCOUNTER (OUTPATIENT)
Dept: HOSPITAL 75 - SDC | Age: 48
Discharge: HOME | End: 2018-10-31
Attending: SURGERY
Payer: COMMERCIAL

## 2018-10-31 VITALS — SYSTOLIC BLOOD PRESSURE: 151 MMHG | DIASTOLIC BLOOD PRESSURE: 85 MMHG

## 2018-10-31 VITALS — SYSTOLIC BLOOD PRESSURE: 156 MMHG | DIASTOLIC BLOOD PRESSURE: 94 MMHG

## 2018-10-31 VITALS — BODY MASS INDEX: 39.27 KG/M2 | HEIGHT: 60 IN | WEIGHT: 200 LBS

## 2018-10-31 VITALS — DIASTOLIC BLOOD PRESSURE: 86 MMHG | SYSTOLIC BLOOD PRESSURE: 161 MMHG

## 2018-10-31 VITALS — SYSTOLIC BLOOD PRESSURE: 130 MMHG | DIASTOLIC BLOOD PRESSURE: 72 MMHG

## 2018-10-31 DIAGNOSIS — K59.09: ICD-10-CM

## 2018-10-31 DIAGNOSIS — Z11.2: ICD-10-CM

## 2018-10-31 DIAGNOSIS — I10: ICD-10-CM

## 2018-10-31 DIAGNOSIS — F41.9: ICD-10-CM

## 2018-10-31 DIAGNOSIS — E11.9: ICD-10-CM

## 2018-10-31 DIAGNOSIS — E66.9: ICD-10-CM

## 2018-10-31 DIAGNOSIS — Z79.84: ICD-10-CM

## 2018-10-31 DIAGNOSIS — F17.210: ICD-10-CM

## 2018-10-31 DIAGNOSIS — R60.0: ICD-10-CM

## 2018-10-31 DIAGNOSIS — K82.8: ICD-10-CM

## 2018-10-31 DIAGNOSIS — K21.9: ICD-10-CM

## 2018-10-31 DIAGNOSIS — M32.9: ICD-10-CM

## 2018-10-31 DIAGNOSIS — Z79.899: ICD-10-CM

## 2018-10-31 DIAGNOSIS — Z79.82: ICD-10-CM

## 2018-10-31 DIAGNOSIS — K81.1: Primary | ICD-10-CM

## 2018-10-31 DIAGNOSIS — F32.9: ICD-10-CM

## 2018-10-31 DIAGNOSIS — Z87.19: ICD-10-CM

## 2018-10-31 DIAGNOSIS — M79.7: ICD-10-CM

## 2018-10-31 DIAGNOSIS — I48.91: ICD-10-CM

## 2018-10-31 PROCEDURE — 82962 GLUCOSE BLOOD TEST: CPT

## 2018-10-31 PROCEDURE — 88304 TISSUE EXAM BY PATHOLOGIST: CPT

## 2018-10-31 PROCEDURE — 87081 CULTURE SCREEN ONLY: CPT

## 2018-10-31 RX ADMIN — ONDANSETRON PRN MG: 2 INJECTION, SOLUTION INTRAMUSCULAR; INTRAVENOUS at 13:39

## 2018-10-31 RX ADMIN — SODIUM CHLORIDE, SODIUM LACTATE, POTASSIUM CHLORIDE, AND CALCIUM CHLORIDE PRN MLS/HR: 600; 310; 30; 20 INJECTION, SOLUTION INTRAVENOUS at 10:33

## 2018-10-31 RX ADMIN — ONDANSETRON PRN MG: 2 INJECTION, SOLUTION INTRAMUSCULAR; INTRAVENOUS at 13:49

## 2018-10-31 RX ADMIN — SODIUM CHLORIDE, SODIUM LACTATE, POTASSIUM CHLORIDE, AND CALCIUM CHLORIDE PRN MLS/HR: 600; 310; 30; 20 INJECTION, SOLUTION INTRAVENOUS at 14:11

## 2018-10-31 RX ADMIN — ONDANSETRON PRN MG: 2 INJECTION, SOLUTION INTRAMUSCULAR; INTRAVENOUS at 13:30

## 2018-10-31 NOTE — DISCHARGE INST-SURGICAL
D/C Lap Instructions-KIDO


New, Converted, or Re-Newed RX:  RX on Chart


Follow Up Appt in 2 weeks





Activity as tolerated


No driving for 24 hours


No driving while on pain medications





Incentive Spirometry use every 2 hours while awake





Regular Diet





Symptoms to Report: Fever over 101 degree F, Nausea/Vomiting 


Infection Signs and Symptoms to report:  Increased redness, Foul odor of wound, 

Increased drainage





Bathing instructions: May shower


Operative Area Clean/Dry;  Keep incision clean/dry





If any problems/questions: Contact your physician or go to Emergency Room











TRISTA OSMAN Oct 31, 2018 9:50 am

## 2018-10-31 NOTE — PROGRESS NOTE-PRE OPERATIVE
Pre-Operative Progress Note


H&P Reviewed


The H&P was reviewed, patient examined and no changes noted.


Date Seen by Provider:  Oct 31, 2018


Time Seen by Provider:  09:40


Date H&P Reviewed:  Oct 31, 2018


Time H&P Reviewed:  09:45


Pre-Operative Diagnosis:  Symptomatic Biliary Dyskinesia











TRISTA OSMAN Oct 31, 2018 9:47 am

## 2018-10-31 NOTE — OPERATIVE REPORT
DATE OF SERVICE:  10/31/2018



ATTENDING PRIMARY CARE PHYSICIAN:

VANDANA Pena



PREOPERATIVE DIAGNOSIS:

Symptomatic biliary dyskinesia.



POSTOPERATIVE DIAGNOSIS:

Symptomatic biliary dyskinesia.



PROCEDURE:

Laparoscopic cholecystectomy.



SURGEON:

Lenin Francisco MD



ASSISTANT:

BEVERLY Whalen



ANESTHESIA:

General endotracheal.



ESTIMATED BLOOD LOSS:

Minimal.



FINDINGS:

Hepatomegaly, distended gallbladder, thick mesentery.



DISPOSITION:

The patient tolerated this procedure well.



INDICATIONS:

The patient is a 48-year-old female known to us.  She has had issues with

gastroesophageal reflux disease, fibromyalgia, anxiety, depression, hypertension

as well as lower extremity edema.  She reports that she has had a multitude of

different gastrointestinal issues in the past; however, these have worsened. 

She reports epigastric pain, which has worsened.  She states that she has had

gastroesophageal reflux disease and has been taking omeprazole and other acid

reducers since age 20.  She states that her medications kept her symptoms under

control; however, she has had reoccurrence of symptoms and has added Zantac 150

mg b.i.d.  An EGD was performed on 10/24/2018, which showed a reflux esophagitis

stage II, small hiatal hernia 2 cm in size as well as a mild gastritis and

duodenitis.  Biopsies were negative for H. pylori as well as negative for

Jacobs's esophagus.  She underwent a HIDA scan, which showed a normal ejection

fraction of 54%; however, she did have severe reproduction of symptoms, which

included right upper abdominal quadrant pain with radiation towards the back and

associated nausea and vomiting consistent with a biliary dyskinesia.



DESCRIPTION OF PROCEDURE:

The patient was brought to the operating room, laid supine on the table.  After

adequate IV pain and sedative medications and general endotracheal intubation,

the abdomen was prepped and draped in standard surgical fashion.



A 0.5% Marcaine with epinephrine was then used to anesthetize the overlying skin

to the left upper abdominal quadrant and a small transverse incision made using

a 15 blade.  An 0 silk suture was applied to the medial aspect of the incision

for retraction and a Veress needle inserted with a low opening pressure of 0

mmHg and the abdomen was then insufflated to 15 mmHg pressure.  The Veress

needle removed and a 5 mm Xcel trocar placed followed by a 5 mm 45 degree angle

laparoscope visualizing the peritoneal cavity.



A 4-quadrant abdominal exploration was performed.  There was a moderate

hepatomegaly as well as thickened mesentery.  There was a dilated gallbladder,

no inflammation.



Under direct visualization, we then proceed to place a supraumbilical 10 mm port

after the skin and peritoneal lining were anesthetized using 0.5% Marcaine with

epinephrine and a transverse skin incision made using a 15 blade.  In a similar

manner, a right upper abdominal quadrant 5 mm port was placed.



The patient was then placed in reverse Trendelenburg position as well as plane

right side up, left side down.  The fundus of the gallbladder was then retracted

anteriorly and superiorly.  The hepatoduodenal ligament was then opened using

hook instrument with blunt dissection as well as electrocautery.  The entire

critical view of safety was identified including the triangle of Calot, the

cystic duct and artery as the only two structures going into the gallbladder as

well as the cystic plate behind the proximal gallbladder.  A timeout was then

taken and the cystic duct and artery were then clipped proximally, distally and

cut with EndoShears.  The gallbladder was then dissected off the liver bed using

electrocautery and hook instrument with visualization of good hemostasis as well

as no leaking ducts of Luschka.  The gallbladder was removed through the 10 mm

port site using an EndoCatch bag.



The 10 mm fascia and peritoneum were then closed under direct visualization

using a Faraz-Zion device and an 0 Vicryl suture.  The abdomen was

desufflated and remaining ports removed.  All skin incisions were closed using

4-0 Monocryl running subcuticular sutures.  Wounds were then cleaned and covered

with Dermabond.



The patient tolerated the procedure well.  We will start IV and oral pain

medication as well as a clear liquid diet.  Once she is tolerating clears and

has good pain control with oral pain medication and is ambulating, we will

discharge her home.  We will also instruct her to refrain from any heavy lifting

or exertion for the next two weeks and have her follow up in the office.





Job ID: [id number]

DocumentID: [id number]

Dictated Date:  10/31/2018 12:46:15

Transcription Date: 10/31/2018 22:13:22

Dictated By: MD SHANICE CABA

## 2018-10-31 NOTE — PROGRESS NOTE-POST OPERATIVE
Post-Operative Progess Note


Surgeon (s)/Assistant (s)


Surgeon


JOELLE ROSE MD


Assistant:  osmel mathews APRN





Pre-Operative Diagnosis


Symptomatic Biliary Dyskinesia





Post-Operative Diagnosis





same





Procedure & Operative Findings


Date of Procedure


10/31/18


Procedure Performed/Findings


laparoscopic cholecystectomy


Anesthesia Type


GET





Estimated Blood Loss


Estimated blood loss (mL):  minimal





Specimens/Packing


Specimens Removed


gallbladder











JOELLE ROSE MD Oct 31, 2018 12:37 pm

## 2018-11-01 ENCOUNTER — HOSPITAL ENCOUNTER (EMERGENCY)
Dept: HOSPITAL 75 - ER | Age: 48
Discharge: HOME | End: 2018-11-01
Payer: COMMERCIAL

## 2018-11-01 VITALS — DIASTOLIC BLOOD PRESSURE: 81 MMHG | SYSTOLIC BLOOD PRESSURE: 155 MMHG

## 2018-11-01 VITALS — HEIGHT: 60 IN | WEIGHT: 194 LBS | BODY MASS INDEX: 38.09 KG/M2

## 2018-11-01 DIAGNOSIS — G89.18: Primary | ICD-10-CM

## 2018-11-01 DIAGNOSIS — K21.9: ICD-10-CM

## 2018-11-01 DIAGNOSIS — I48.91: ICD-10-CM

## 2018-11-01 DIAGNOSIS — Z87.442: ICD-10-CM

## 2018-11-01 DIAGNOSIS — K59.00: ICD-10-CM

## 2018-11-01 DIAGNOSIS — F17.210: ICD-10-CM

## 2018-11-01 DIAGNOSIS — Z85.828: ICD-10-CM

## 2018-11-01 DIAGNOSIS — Z98.890: ICD-10-CM

## 2018-11-01 DIAGNOSIS — Z79.82: ICD-10-CM

## 2018-11-01 DIAGNOSIS — Z90.49: ICD-10-CM

## 2018-11-01 DIAGNOSIS — Z90.710: ICD-10-CM

## 2018-11-01 DIAGNOSIS — I10: ICD-10-CM

## 2018-11-01 DIAGNOSIS — E11.9: ICD-10-CM

## 2018-11-01 DIAGNOSIS — Z98.51: ICD-10-CM

## 2018-11-01 DIAGNOSIS — Z79.84: ICD-10-CM

## 2018-11-01 LAB
ALBUMIN SERPL-MCNC: 3.9 GM/DL (ref 3.2–4.5)
ALP SERPL-CCNC: 53 U/L (ref 40–136)
ALT SERPL-CCNC: 61 U/L (ref 0–55)
ANISOCYTOSIS BLD QL SMEAR: SLIGHT
APTT PPP: YELLOW S
BACTERIA #/AREA URNS HPF: (no result) /HPF
BASOPHILS # BLD AUTO: 0 10^3/UL (ref 0–0.1)
BASOPHILS NFR BLD AUTO: 0 % (ref 0–10)
BASOPHILS NFR BLD MANUAL: 2 %
BILIRUB SERPL-MCNC: 0.8 MG/DL (ref 0.1–1)
BILIRUB UR QL STRIP: NEGATIVE
BUN/CREAT SERPL: 7
CALCIUM SERPL-MCNC: 9.1 MG/DL (ref 8.5–10.1)
CHLORIDE SERPL-SCNC: 104 MMOL/L (ref 98–107)
CO2 SERPL-SCNC: 22 MMOL/L (ref 21–32)
CREAT SERPL-MCNC: 0.69 MG/DL (ref 0.6–1.3)
EOSINOPHIL # BLD AUTO: 0.3 10^3/UL (ref 0–0.3)
EOSINOPHIL NFR BLD AUTO: 2 % (ref 0–10)
EOSINOPHIL NFR BLD MANUAL: 2 %
ERYTHROCYTE [DISTWIDTH] IN BLOOD BY AUTOMATED COUNT: 16.7 % (ref 10–14.5)
FIBRINOGEN PPP-MCNC: CLEAR MG/DL
GFR SERPLBLD BASED ON 1.73 SQ M-ARVRAT: > 60 ML/MIN
GLUCOSE SERPL-MCNC: 117 MG/DL (ref 70–105)
GLUCOSE UR STRIP-MCNC: NEGATIVE MG/DL
HCT VFR BLD CALC: 41 % (ref 35–52)
HGB BLD-MCNC: 13.7 G/DL (ref 11.5–16)
KETONES UR QL STRIP: NEGATIVE
LEUKOCYTE ESTERASE UR QL STRIP: NEGATIVE
LIPASE SERPL-CCNC: 8 U/L (ref 8–78)
LYMPHOCYTES # BLD AUTO: 1.5 X 10^3 (ref 1–4)
LYMPHOCYTES NFR BLD AUTO: 9 % (ref 12–44)
MANUAL DIFFERENTIAL PERFORMED BLD QL: YES
MCH RBC QN AUTO: 27 PG (ref 25–34)
MCHC RBC AUTO-ENTMCNC: 33 G/DL (ref 32–36)
MCV RBC AUTO: 81 FL (ref 80–99)
MONOCYTES # BLD AUTO: 1.5 X 10^3 (ref 0–1)
MONOCYTES NFR BLD AUTO: 9 % (ref 0–12)
MONOCYTES NFR BLD: 6 %
NEUTROPHILS # BLD AUTO: 13.9 X 10^3 (ref 1.8–7.8)
NEUTROPHILS NFR BLD AUTO: 81 % (ref 42–75)
NEUTS BAND NFR BLD MANUAL: 76 %
NEUTS BAND NFR BLD: 0 %
NITRITE UR QL STRIP: NEGATIVE
PH UR STRIP: 6.5 [PH] (ref 5–9)
PLATELET # BLD: 328 10^3/UL (ref 130–400)
PMV BLD AUTO: 10.2 FL (ref 7.4–10.4)
POTASSIUM SERPL-SCNC: 3.9 MMOL/L (ref 3.6–5)
PROT SERPL-MCNC: 6.9 GM/DL (ref 6.4–8.2)
PROT UR QL STRIP: NEGATIVE
RBC # BLD AUTO: 5.06 10^6/UL (ref 4.35–5.85)
RBC #/AREA URNS HPF: (no result) /HPF
SODIUM SERPL-SCNC: 137 MMOL/L (ref 135–145)
SP GR UR STRIP: 1.01 (ref 1.02–1.02)
SQUAMOUS #/AREA URNS HPF: (no result) /HPF
UROBILINOGEN UR-MCNC: 1 MG/DL
VARIANT LYMPHS NFR BLD MANUAL: 14 %
WBC # BLD AUTO: 17.2 10^3/UL (ref 4.3–11)
WBC #/AREA URNS HPF: (no result) /HPF

## 2018-11-01 PROCEDURE — 36415 COLL VENOUS BLD VENIPUNCTURE: CPT

## 2018-11-01 PROCEDURE — 71046 X-RAY EXAM CHEST 2 VIEWS: CPT

## 2018-11-01 PROCEDURE — 74177 CT ABD & PELVIS W/CONTRAST: CPT

## 2018-11-01 PROCEDURE — 96376 TX/PRO/DX INJ SAME DRUG ADON: CPT

## 2018-11-01 PROCEDURE — 85007 BL SMEAR W/DIFF WBC COUNT: CPT

## 2018-11-01 PROCEDURE — 96372 THER/PROPH/DIAG INJ SC/IM: CPT

## 2018-11-01 PROCEDURE — 96374 THER/PROPH/DIAG INJ IV PUSH: CPT

## 2018-11-01 PROCEDURE — 83690 ASSAY OF LIPASE: CPT

## 2018-11-01 PROCEDURE — 85027 COMPLETE CBC AUTOMATED: CPT

## 2018-11-01 PROCEDURE — 81000 URINALYSIS NONAUTO W/SCOPE: CPT

## 2018-11-01 PROCEDURE — 96375 TX/PRO/DX INJ NEW DRUG ADDON: CPT

## 2018-11-01 PROCEDURE — 94664 DEMO&/EVAL PT USE INHALER: CPT

## 2018-11-01 PROCEDURE — 82962 GLUCOSE BLOOD TEST: CPT

## 2018-11-01 PROCEDURE — 96361 HYDRATE IV INFUSION ADD-ON: CPT

## 2018-11-01 PROCEDURE — 80053 COMPREHEN METABOLIC PANEL: CPT

## 2018-11-01 RX ADMIN — MORPHINE SULFATE STA MG: 10 INJECTION, SOLUTION INTRAMUSCULAR; INTRAVENOUS at 19:05

## 2018-11-01 RX ADMIN — MORPHINE SULFATE STA MG: 10 INJECTION, SOLUTION INTRAMUSCULAR; INTRAVENOUS at 19:41

## 2018-11-01 RX ADMIN — SODIUM CHLORIDE ONE ML: 900 INJECTION, SOLUTION INTRAVENOUS at 18:30

## 2018-11-01 RX ADMIN — OXYCODONE HYDROCHLORIDE AND ACETAMINOPHEN STA TAB: 5; 325 TABLET ORAL at 21:47

## 2018-11-01 RX ADMIN — METHYLNALTREXONE BROMIDE ONE MG: 12 INJECTION, SOLUTION SUBCUTANEOUS at 20:00

## 2018-11-01 RX ADMIN — Medication PRN ML: at 18:30

## 2018-11-01 RX ADMIN — SODIUM CHLORIDE ONE MLS/HR: 900 INJECTION, SOLUTION INTRAVENOUS at 20:01

## 2018-11-01 RX ADMIN — KETOROLAC TROMETHAMINE STA MG: 30 INJECTION, SOLUTION INTRAMUSCULAR; INTRAVENOUS at 19:42

## 2018-11-01 RX ADMIN — IOHEXOL ONE ML: 350 INJECTION, SOLUTION INTRAVENOUS at 18:30

## 2018-11-01 NOTE — DIAGNOSTIC IMAGING REPORT
PROCEDURE: CT abdomen and pelvis with contrast.



TECHNIQUE: Multiple contiguous axial images were obtained through

the abdomen and pelvis after administration of intravenous

contrast. 



INDICATION: Right-sided abdominal pain. Cholecystectomy

yesterday.



FINDINGS: There is mild atelectasis in the right lung base. There

is a small amount of perihepatic fluid, laterally. There is no

fluid in the biliary fossa. There are surgical clips noted in the

biliary fossa. The intrahepatic radicles and common bile ducts

are not dilated. The liver shows fatty change. The pancreas

appears normal. The pancreatic duct is not distended. The spleen

is normal. Adrenal glands are normal. The kidneys appear normal.

There is normal enhancement of the abdominal organs and vessels

following IV contrast. The stomach is not distended. Small bowel

does show some fluid-filled loops though no significant

distention demonstrated. The colon shows moderate amount of stool

and fluid in the ascending colon and transverse colon which is

mildly distended. The distal colon from the splenic flexure to

the rectum is empty. No evidence of diverticulitis. There is a

trace of fluid in the pelvis in the rectovesical pouch. The

uterus is absent. No pelvic masses. The appendix is visualized

and normal. No intra-abdominal adenopathy. No free air.



IMPRESSION:

1. There is some atelectasis in the right lung base.

2. There is a very small amount of fluid along the lateral

perihepatic space and within the pelvis. There is no evidence of

fluid to suggest bile leak in the biliary fossa.

3. Bile ducts and pancreatic duct are not distended.

4. Bowel gas pattern is normal. Appendix is normal.



Dictated by: 



  Dictated on workstation # [id number]
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
## 2018-11-01 NOTE — DIAGNOSTIC IMAGING REPORT
INDICATION: Severe abdominal pain, postop gallbladder surgery.



FINDINGS: Frontal and lateral views of the chest demonstrate

minimal linear atelectasis in the left lung. Heart and

vascularity are normal. There are no pleural effusions.



IMPRESSION: There is minimal atelectasis. 



Dictated by: 



  Dictated on workstation # [id number]

## 2019-11-25 ENCOUNTER — HOSPITAL ENCOUNTER (OUTPATIENT)
Dept: HOSPITAL 75 - CARD | Age: 49
LOS: 90 days | Discharge: HOME | End: 2020-02-23
Attending: INTERNAL MEDICINE
Payer: COMMERCIAL

## 2019-11-25 DIAGNOSIS — I48.0: Primary | ICD-10-CM

## 2020-01-10 ENCOUNTER — HOSPITAL ENCOUNTER (OUTPATIENT)
Dept: HOSPITAL 75 - CARD | Age: 50
End: 2020-01-10
Attending: PHYSICIAN ASSISTANT
Payer: COMMERCIAL

## 2020-01-10 DIAGNOSIS — I48.0: ICD-10-CM

## 2020-01-10 DIAGNOSIS — Z72.0: ICD-10-CM

## 2020-01-10 DIAGNOSIS — K21.0: ICD-10-CM

## 2020-01-10 DIAGNOSIS — I11.9: Primary | ICD-10-CM

## 2020-01-10 PROCEDURE — 93306 TTE W/DOPPLER COMPLETE: CPT

## 2020-01-29 ENCOUNTER — HOSPITAL ENCOUNTER (OUTPATIENT)
Dept: HOSPITAL 75 - RAD | Age: 50
End: 2020-01-29
Attending: NURSE PRACTITIONER
Payer: COMMERCIAL

## 2020-01-29 DIAGNOSIS — R22.42: Primary | ICD-10-CM

## 2020-01-29 PROCEDURE — 76881 US COMPL JOINT R-T W/IMG: CPT

## 2020-02-25 ENCOUNTER — HOSPITAL ENCOUNTER (OUTPATIENT)
Dept: HOSPITAL 75 - PREOP | Age: 50
Discharge: HOME | End: 2020-02-25
Attending: SURGERY
Payer: COMMERCIAL

## 2020-02-25 VITALS — HEIGHT: 59.84 IN | WEIGHT: 189.6 LBS | BODY MASS INDEX: 37.22 KG/M2

## 2020-02-25 DIAGNOSIS — Z01.818: Primary | ICD-10-CM

## 2020-03-04 VITALS — DIASTOLIC BLOOD PRESSURE: 79 MMHG | SYSTOLIC BLOOD PRESSURE: 125 MMHG

## 2020-03-04 RX ADMIN — SODIUM CHLORIDE SCH MLS/HR: 900 INJECTION, SOLUTION INTRAVENOUS at 13:30

## 2020-03-11 ENCOUNTER — HOSPITAL ENCOUNTER (OUTPATIENT)
Dept: HOSPITAL 75 - SDC | Age: 50
Discharge: HOME | End: 2020-03-11
Attending: NURSE PRACTITIONER
Payer: MEDICARE

## 2020-03-11 VITALS — DIASTOLIC BLOOD PRESSURE: 6 MMHG | SYSTOLIC BLOOD PRESSURE: 120 MMHG

## 2020-03-11 DIAGNOSIS — D50.9: Primary | ICD-10-CM

## 2020-03-11 PROCEDURE — 96365 THER/PROPH/DIAG IV INF INIT: CPT

## 2020-03-11 RX ADMIN — SODIUM CHLORIDE SCH MLS/HR: 900 INJECTION, SOLUTION INTRAVENOUS at 13:12

## 2020-10-02 ENCOUNTER — HOSPITAL ENCOUNTER (OUTPATIENT)
Dept: HOSPITAL 75 - RAD | Age: 50
End: 2020-10-02
Attending: NURSE PRACTITIONER
Payer: COMMERCIAL

## 2020-10-02 DIAGNOSIS — R10.84: Primary | ICD-10-CM

## 2020-10-02 DIAGNOSIS — Z87.442: ICD-10-CM

## 2020-10-02 PROCEDURE — 74177 CT ABD & PELVIS W/CONTRAST: CPT

## 2020-10-02 NOTE — DIAGNOSTIC IMAGING REPORT
PROCEDURE: CT abdomen and pelvis with contrast.



TECHNIQUE: Multiple contiguous axial images were obtained through

the abdomen and pelvis after administration of intravenous

contrast. Auto Exposure Controls were utilized during the CT exam

to meet ALARA standards for radiation dose reduction. All CT

scans use one or more of the following dose optimizing

techniques: automated exposure control, MA and/or KvP adjustment

based on patient size and exam type or iterative reconstruction.



INDICATION:  Flank pain, abdominal pain.



COMPARISON: 11/01/2018



FINDINGS: The visualized lung bases are clear. Cholecystectomy.

The liver, spleen, adrenal glands, and pancreas are unremarkable.

The bilateral kidneys and ureters are unremarkable. No aneurysmal

dilatation of the abdominal aorta. The appendix is unremarkable.

The urinary bladder is unremarkable. Uterus is not visualized,

likely surgically absent. No abnormal adnexal mass lesion. No

bowel obstruction or pneumatosis. Mild stenosis involving the

proximal left common iliac artery. No significant adenopathy,

free air, or free fluid within the abdomen or pelvis. Scattered

osseous degenerative changes without acute osseous abnormality.



IMPRESSION: No acute abnormality.



Cholecystectomy.



Dictated by: 



  Dictated on workstation # [id number]

## 2020-12-11 ENCOUNTER — HOSPITAL ENCOUNTER (OUTPATIENT)
Dept: HOSPITAL 75 - LAB | Age: 50
LOS: 90 days | Discharge: HOME | End: 2021-03-11
Attending: NURSE PRACTITIONER
Payer: COMMERCIAL

## 2020-12-11 DIAGNOSIS — E27.49: Primary | ICD-10-CM

## 2020-12-11 LAB
ALBUMIN SERPL-MCNC: 4 GM/DL (ref 3.2–4.5)
ALP SERPL-CCNC: 46 U/L (ref 40–136)
ALT SERPL-CCNC: 29 U/L (ref 0–55)
BILIRUB SERPL-MCNC: 0.4 MG/DL (ref 0.1–1)
BUN/CREAT SERPL: 18
CALCIUM SERPL-MCNC: 9.7 MG/DL (ref 8.5–10.1)
CHLORIDE SERPL-SCNC: 103 MMOL/L (ref 98–107)
CO2 SERPL-SCNC: 24 MMOL/L (ref 21–32)
CREAT SERPL-MCNC: 0.72 MG/DL (ref 0.6–1.3)
GFR SERPLBLD BASED ON 1.73 SQ M-ARVRAT: > 60 ML/MIN
GLUCOSE SERPL-MCNC: 118 MG/DL (ref 70–105)
MAGNESIUM SERPL-MCNC: 2.1 MG/DL (ref 1.6–2.4)
POTASSIUM SERPL-SCNC: 3.8 MMOL/L (ref 3.6–5)
PROT SERPL-MCNC: 6.8 GM/DL (ref 6.4–8.2)
SODIUM SERPL-SCNC: 139 MMOL/L (ref 135–145)

## 2020-12-11 PROCEDURE — 83735 ASSAY OF MAGNESIUM: CPT

## 2020-12-11 PROCEDURE — 82533 TOTAL CORTISOL: CPT

## 2020-12-11 PROCEDURE — 36415 COLL VENOUS BLD VENIPUNCTURE: CPT

## 2020-12-11 PROCEDURE — 80053 COMPREHEN METABOLIC PANEL: CPT

## 2021-04-20 ENCOUNTER — HOSPITAL ENCOUNTER (OUTPATIENT)
Dept: HOSPITAL 75 - RAD | Age: 51
End: 2021-04-20
Attending: NURSE PRACTITIONER
Payer: COMMERCIAL

## 2021-04-20 DIAGNOSIS — Z86.012: ICD-10-CM

## 2021-04-20 DIAGNOSIS — Z87.442: ICD-10-CM

## 2021-04-20 DIAGNOSIS — E27.40: Primary | ICD-10-CM

## 2021-04-20 PROCEDURE — 74177 CT ABD & PELVIS W/CONTRAST: CPT

## 2021-04-20 NOTE — DIAGNOSTIC IMAGING REPORT
PROCEDURE: CT abdomen and pelvis with contrast.



TECHNIQUE: Multiple contiguous axial images were obtained through

the abdomen and pelvis after administration of intravenous

contrast. Auto Exposure Controls were utilized during the CT exam

to meet ALARA standards for radiation dose reduction. All CT

scans use one or more of the following dose optimizing

techniques: automated exposure control, MA and/or KvP adjustment

based on patient size and exam type or iterative reconstruction.



INDICATION:  Bilateral flank pain, adrenal insufficiency.



Compared with study 10/02/2020.



FINDINGS: The adrenal glands are symmetric, normal and stable.

The liver unremarkable. The gallbladder surgically absent. There

is no bile duct dilatation. The pancreas is normal. The

unobstructed kidneys were normal. The spleen normal in size and

nonfocal. There is no ascites, abscess, hematoma or acute fluid

collection. There is no diverticulitis or appendicitis. No

inflammatory process or acute abnormalities. No aneurysm,

adenopathy or mass. No ascites.



IMPRESSION: Stable normal CT abdomen and pelvis

postcholecystectomy.



Dictated by: 



  Dictated on workstation # [id number]

## 2021-04-26 ENCOUNTER — HOSPITAL ENCOUNTER (OUTPATIENT)
Dept: HOSPITAL 75 - SLEEP | Age: 51
Discharge: HOME | End: 2021-04-26
Attending: NURSE PRACTITIONER
Payer: COMMERCIAL

## 2021-04-26 DIAGNOSIS — G47.10: Primary | ICD-10-CM

## 2021-04-26 DIAGNOSIS — E27.40: ICD-10-CM

## 2021-04-26 DIAGNOSIS — G47.61: ICD-10-CM

## 2021-04-26 DIAGNOSIS — I49.9: ICD-10-CM

## 2021-04-26 DIAGNOSIS — I10: ICD-10-CM

## 2021-10-15 ENCOUNTER — HOSPITAL ENCOUNTER (OUTPATIENT)
Dept: HOSPITAL 75 - RAD | Age: 51
End: 2021-10-15
Attending: NURSE PRACTITIONER
Payer: COMMERCIAL

## 2021-10-15 DIAGNOSIS — R60.0: Primary | ICD-10-CM

## 2021-10-15 PROCEDURE — 76536 US EXAM OF HEAD AND NECK: CPT

## 2021-10-15 NOTE — DIAGNOSTIC IMAGING REPORT
CLINICAL INDICATION: Patient with supraclavicular edema.



EXAM: Focused ultrasound of the lower neck region of concern near

midline.



COMPARISON: None.



FINDINGS AND 

IMPRESSION:

There is no neck soft tissue mass, fluid collection, or

lymphadenopathy seen in the area of concern. Limited

visualization of the thyroid gland shows no gross abnormality. If

there is concern for thyroid abnormality, dedicated thyroid

ultrasound would better evaluate.



Dictated by: 



  Dictated on workstation # [id number]

## 2021-11-11 ENCOUNTER — HOSPITAL ENCOUNTER (OUTPATIENT)
Dept: HOSPITAL 75 - RAD | Age: 51
End: 2021-11-11
Attending: NURSE PRACTITIONER
Payer: COMMERCIAL

## 2021-11-11 DIAGNOSIS — H05.20: ICD-10-CM

## 2021-11-11 DIAGNOSIS — H54.3: Primary | ICD-10-CM

## 2021-11-11 PROCEDURE — 70482 CT ORBIT/EAR/FOSSA W/O&W/DYE: CPT

## 2021-11-11 NOTE — DIAGNOSTIC IMAGING REPORT
TECHNIQUE: CT of the orbits was performed with and without

contrast. Sagittal and coronal reformats were performed and

reviewed. All CT scans use one or more of the following dose

optimizing techniques: Automated exposure control, MA and/or KvP

adjustment based on patient size and exam type or iterative

reconstruction.



REASON FOR EXAM: Right eye bulging. Decreased vision. Pressure in

both eyes.



COMPARISON: 10/15/2021. 05/26/2018.



FINDINGS: No evidence of mass or fluid collection in the globes

and orbits. The globes are intact bilaterally. No postseptal

inflammatory changes are seen. No evidence of exophthalmos. The

extraocular muscles are symmetric and unremarkable. No abnormal

enhancement is appreciated in the bilateral optic nerves. No

excessive fluid is seen within the optic nerve sheaths. The

cavernous sinuses have a normal appearance. No abnormality is

seen within the ophthalmic veins. No pre-septal inflammatory

changes are seen. No radiopaque foreign bodies are seen.



No acute facial fractures are identified. The included

intracranial contents demonstrate no acute abnormalities.



IMPRESSION:

1. Unremarkable CT appearance of the globes and orbits. No

evidence of mass or postseptal inflammatory changes. No abnormal

enhancement is seen. Consider MRI of the orbits with and without

contrast to further evaluate if symptoms persist.



Dictated by: 



  Dictated on workstation # [id number]

## 2021-11-30 ENCOUNTER — HOSPITAL ENCOUNTER (OUTPATIENT)
Dept: HOSPITAL 75 - RAD | Age: 51
End: 2021-11-30
Attending: NURSE PRACTITIONER
Payer: COMMERCIAL

## 2021-11-30 DIAGNOSIS — Z12.31: Primary | ICD-10-CM

## 2021-11-30 DIAGNOSIS — Z78.0: ICD-10-CM

## 2021-11-30 PROCEDURE — 77067 SCR MAMMO BI INCL CAD: CPT

## 2021-11-30 PROCEDURE — 77063 BREAST TOMOSYNTHESIS BI: CPT

## 2021-11-30 PROCEDURE — 77080 DXA BONE DENSITY AXIAL: CPT

## 2021-11-30 NOTE — DIAGNOSTIC IMAGING REPORT
INDICATION: Postmenopausal screening.



COMPARISON: 09/06/2018.



FINDINGS:



AP Spine L1-L4:  

[BMD (g/cm2): 1.243] [T-Score: 0.4] [Z-Score: -0.1]

[BMD Previous: 1.073] [BMD % Change: 15.8]



LT Hip Neck:       

[BMD (g/cm2): 0.897] [T-Score: -1.0] [Z-Score: -0.8]



LT Hip Total:       

[BMD (g/cm2):1.028] [T-Score:0.2] [Z-Score: 0.0]

[BMD Previous: 1.015] [BMD % Change: 1.3]



RT Hip Neck:      

[BMD (g/cm2):0.890] [T-Score:-1.1] [Z-Score:-0.8]



RT Hip Total:      

[BMD (g/cm2):0.973] [T-score:-0.3] [Z-Score:-0.4]

[BMD Previous:0.963] [BMD % Change:1.0]



*Indicates significant change from prior examination based on 95%

confidence level.



World Health Organization criteria for BMD interpretation

classify patients as Normal (T-score at or above -1.0),

Osteopenic (T-score between -1.0 and -2.5) or Osteoporotic

(T-score at or below -2.5).



LIMITATIONS AND MODIFICATION:  None.



FRACTURE RISK (FRAX SCORE):

The ten year probability of (%): 

Major Osteoporotic Fracture: [NA]

Hip Fracture: [NA]



IMPRESSION:

1. Normal bone mineral density. 

2. No significant change in bone mineral density since prior

examination. 

3. See below National Osteoporosis Foundation guidelines on when

to potentially initiate pharmacologic therapy. 



Based on the National Osteoporosis Foundation Guidelines,

pharmacologic treatment should be initiated in any of the

following, unless clinical conditions suggest otherwise:



*  Any patient with prior fragility fracture of the hip or

vertebrae. A spine fracture indicates 5X risk for subsequent

spine fracture and 2X risk for subsequent hip fracture.



*  Osteoporosis (T-score <-2.5).



*  Postmenopausal women and men age 50 and older with low bone

mass/osteopenia (T-score between -1.0 and -2.5) by DXA and

10-year major osteoporotic fracture greater than 20% or a 10-year

probability of hip fracture greater than 3%. These fracture risks

are supplied above in the FRAX score, if applicable.



*  Clinician judgement and/or patient preferences may indicate

treatment for people with 10-year fracture probabilities above or

below these levels.



Dictated by: 



  Dictated on workstation # [id number]

## 2021-12-01 NOTE — DIAGNOSTIC IMAGING REPORT
Digital mammogram.



Indication:  Bilateral screening



There are no prior studies available for comparison. 



At this time there are no current complaints.



The breasts are predominantly fatty. On the craniocaudad view of

the left breast in the lateral aspect of the breast approximately

4 cm from nipple there is a small 3 mm nodular asymmetry. This

finding seems to be at the level of the nipple on the MLO

tomographic views. This does have a benign appearance. However as

there are no prior studies available for comparison I would

recommend ultrasound be performed to better characterize this

finding.



There is no primary or secondary sign of malignancy noted

otherwise.



Impression:  Ultrasound would be recommended to better

characterize the small benign-appearing nodular density in the

medial retroareolar region of the left breast. 



ACR BI-RADS Category 0: Incomplete. (Needs additional imaging

evaluation).

Result letter will be mailed to the patient.

Note: At least 10% of breast cancer is not imaged by mammography.



Dictated by: 



  Dictated on workstation # [id number]

## 2022-01-06 ENCOUNTER — HOSPITAL ENCOUNTER (OUTPATIENT)
Dept: HOSPITAL 75 - RAD | Age: 52
End: 2022-01-06
Attending: NURSE PRACTITIONER
Payer: COMMERCIAL

## 2022-01-06 DIAGNOSIS — N63.20: Primary | ICD-10-CM

## 2022-01-06 PROCEDURE — 76642 ULTRASOUND BREAST LIMITED: CPT

## 2022-01-06 NOTE — DIAGNOSTIC IMAGING REPORT
Ultrasound left breast, limited.



Indication:  Abnormal mammogram



The screening mammogram performed on 12/01/2021 noted a small 3

mm nodular asymmetry in the lateral retroareolar region of the

left breast. The ultrasound examination of this area shows that

there is a fairly well-circumscribed avascular hypoechoic lesion

measuring 4 x 4 mm in the lateral retroareolar region of the left

breast. I suspect this corresponds to the finding of the

mammogram. This is most likely a benign process such as a small

cyst or perhaps a complicated cyst, as there are no prior studies

available for comparison. However it may prove worthwhile to have

a short-term (6 month) followup mammogram and ultrasound exam for

further study.



Impression:  The small nodular asymmetry seen on mammogram is

felt to represent a benign process. Recommendations as above.



ACR category 3 



ACR BI-RADS Category 3: Probably benign findings.

Result letter will be mailed to the patient.

Note: At least 10% of breast cancer is not imaged by mammography.



Dictated by: 



  Dictated on workstation # [id number]

## 2022-03-07 ENCOUNTER — HOSPITAL ENCOUNTER (OUTPATIENT)
Dept: HOSPITAL 75 - CARD | Age: 52
End: 2022-03-07
Attending: PHYSICIAN ASSISTANT
Payer: COMMERCIAL

## 2022-03-07 VITALS — WEIGHT: 198.42 LBS | HEIGHT: 63.78 IN | BODY MASS INDEX: 34.29 KG/M2

## 2022-03-07 VITALS — SYSTOLIC BLOOD PRESSURE: 100 MMHG | DIASTOLIC BLOOD PRESSURE: 86 MMHG

## 2022-03-07 DIAGNOSIS — I48.0: Primary | ICD-10-CM

## 2022-03-07 PROCEDURE — 93017 CV STRESS TEST TRACING ONLY: CPT

## 2022-03-07 PROCEDURE — 78452 HT MUSCLE IMAGE SPECT MULT: CPT

## 2022-03-07 RX ADMIN — Medication PRN ML: at 07:43

## 2022-03-07 RX ADMIN — Medication PRN ML: at 09:37

## 2022-03-07 NOTE — CARDIOLOGY STRESS TEST REPORT
Stress Test Report


Date of Procedure/Referring:


Date of Procedure:  Mar 7, 2022


Madelyn Gonzalez


Admitting Physician


No,Local Physician





Indications:


A Fib





Baseline Heart Rate:


76





Baseline Blood Pressure:


Blood Pressure Systolic:  100


Blood Pressure Diastolic:  86


Baseline Vitals





Vital Signs








  Date Time  Temp Pulse Resp B/P (MAP) Pulse Ox O2 Delivery O2 Flow Rate FiO2


 


3/7/22 09:30  74 20 100/86 (91) 97 Room Air  











Baseline EKG:


Baseline EKG:  NSR





Summary


After explaining the procedure to the patient, she  signed a consent and then 

brought to the stress nuclear laboratory.


Patient received 0.4 mg Lexiscan for stress test, ECG, heart rate and blood 

pressure were monitored continuously.  Resting and stress dose of radio tracer 

were injected, imaging was acquired and reviewed in short axis, horizontal long 

axis and vertical long axis views.


TID:  1.13


SSS:  7


SDS:  5


EF:  73


1.  Patient tolerated Lexiscan well


2.  Breast attenuation with reversible ischemia involving the mid to apical 

anterior wall and anterolateral wall


3.  Normal left ventricular size, EF 73%











LINDA BLANCHARD MD               Mar 7, 2022 12:34

## 2022-03-23 ENCOUNTER — HOSPITAL ENCOUNTER (OUTPATIENT)
Dept: HOSPITAL 75 - CATH | Age: 52
Discharge: HOME | End: 2022-03-23
Attending: INTERNAL MEDICINE
Payer: COMMERCIAL

## 2022-03-23 VITALS — SYSTOLIC BLOOD PRESSURE: 105 MMHG | DIASTOLIC BLOOD PRESSURE: 64 MMHG

## 2022-03-23 VITALS — DIASTOLIC BLOOD PRESSURE: 66 MMHG | SYSTOLIC BLOOD PRESSURE: 98 MMHG

## 2022-03-23 VITALS — DIASTOLIC BLOOD PRESSURE: 56 MMHG | SYSTOLIC BLOOD PRESSURE: 95 MMHG

## 2022-03-23 VITALS — DIASTOLIC BLOOD PRESSURE: 59 MMHG | SYSTOLIC BLOOD PRESSURE: 95 MMHG

## 2022-03-23 VITALS — DIASTOLIC BLOOD PRESSURE: 55 MMHG | SYSTOLIC BLOOD PRESSURE: 92 MMHG

## 2022-03-23 VITALS — WEIGHT: 195.77 LBS | HEIGHT: 60 IN | BODY MASS INDEX: 38.43 KG/M2

## 2022-03-23 VITALS — SYSTOLIC BLOOD PRESSURE: 92 MMHG | DIASTOLIC BLOOD PRESSURE: 56 MMHG

## 2022-03-23 VITALS — SYSTOLIC BLOOD PRESSURE: 98 MMHG | DIASTOLIC BLOOD PRESSURE: 66 MMHG

## 2022-03-23 VITALS — DIASTOLIC BLOOD PRESSURE: 68 MMHG | SYSTOLIC BLOOD PRESSURE: 143 MMHG

## 2022-03-23 VITALS — SYSTOLIC BLOOD PRESSURE: 97 MMHG | DIASTOLIC BLOOD PRESSURE: 60 MMHG

## 2022-03-23 DIAGNOSIS — E27.40: ICD-10-CM

## 2022-03-23 DIAGNOSIS — I25.10: Primary | ICD-10-CM

## 2022-03-23 DIAGNOSIS — E78.5: ICD-10-CM

## 2022-03-23 DIAGNOSIS — Z79.4: ICD-10-CM

## 2022-03-23 DIAGNOSIS — E11.9: ICD-10-CM

## 2022-03-23 DIAGNOSIS — I48.0: ICD-10-CM

## 2022-03-23 DIAGNOSIS — M79.7: ICD-10-CM

## 2022-03-23 DIAGNOSIS — I10: ICD-10-CM

## 2022-03-23 DIAGNOSIS — F17.210: ICD-10-CM

## 2022-03-23 DIAGNOSIS — Z79.899: ICD-10-CM

## 2022-03-23 DIAGNOSIS — K21.9: ICD-10-CM

## 2022-03-23 DIAGNOSIS — Z79.84: ICD-10-CM

## 2022-03-23 LAB
ALBUMIN SERPL-MCNC: 4 GM/DL (ref 3.2–4.5)
ALP SERPL-CCNC: 44 U/L (ref 40–136)
ALT SERPL-CCNC: 35 U/L (ref 0–55)
APTT BLD: 25 SEC (ref 24–35)
APTT PPP: YELLOW S
BACTERIA #/AREA URNS HPF: (no result) /HPF
BILIRUB SERPL-MCNC: 0.4 MG/DL (ref 0.1–1)
BILIRUB UR QL STRIP: NEGATIVE
BUN/CREAT SERPL: 16
CALCIUM SERPL-MCNC: 9.8 MG/DL (ref 8.5–10.1)
CAOX CRY #/AREA URNS LPF: (no result) /LPF
CHLORIDE SERPL-SCNC: 102 MMOL/L (ref 98–107)
CHOLEST SERPL-MCNC: 157 MG/DL (ref ?–200)
CO2 SERPL-SCNC: 25 MMOL/L (ref 21–32)
CREAT SERPL-MCNC: 0.74 MG/DL (ref 0.6–1.3)
FIBRINOGEN PPP-MCNC: CLEAR MG/DL
GFR SERPLBLD BASED ON 1.73 SQ M-ARVRAT: 99 ML/MIN
GLUCOSE SERPL-MCNC: 138 MG/DL (ref 70–105)
GLUCOSE UR STRIP-MCNC: NEGATIVE MG/DL
HCG UR QL: NEGATIVE
HCT VFR BLD CALC: 50 % (ref 35–52)
HDLC SERPL-MCNC: 56 MG/DL (ref 40–60)
HGB BLD-MCNC: 16.2 G/DL (ref 11.5–16)
INR PPP: 0.9 (ref 0.8–1.4)
KETONES UR QL STRIP: (no result)
LEUKOCYTE ESTERASE UR QL STRIP: NEGATIVE
MCH RBC QN AUTO: 31 PG (ref 25–34)
MCHC RBC AUTO-ENTMCNC: 32 G/DL (ref 32–36)
MCV RBC AUTO: 97 FL (ref 80–99)
NITRITE UR QL STRIP: NEGATIVE
PH UR STRIP: 5.5 [PH] (ref 5–9)
PLATELET # BLD: 317 10^3/UL (ref 130–400)
PMV BLD AUTO: 9.4 FL (ref 9–12.2)
POTASSIUM SERPL-SCNC: 4.5 MMOL/L (ref 3.6–5)
PROT SERPL-MCNC: 7.1 GM/DL (ref 6.4–8.2)
PROT UR QL STRIP: NEGATIVE
PROTHROMBIN TIME: 12.5 SEC (ref 12.2–14.7)
RBC #/AREA URNS HPF: (no result) /HPF
SODIUM SERPL-SCNC: 139 MMOL/L (ref 135–145)
SP GR UR STRIP: >=1.03 (ref 1.02–1.02)
SQUAMOUS #/AREA URNS HPF: (no result) /HPF
TRIGL SERPL-MCNC: 106 MG/DL (ref ?–150)
VLDLC SERPL CALC-MCNC: 21 MG/DL (ref 5–40)
WBC # BLD AUTO: 21.2 10^3/UL (ref 4.3–11)
WBC #/AREA URNS HPF: (no result) /HPF

## 2022-03-23 PROCEDURE — 71045 X-RAY EXAM CHEST 1 VIEW: CPT

## 2022-03-23 PROCEDURE — 80053 COMPREHEN METABOLIC PANEL: CPT

## 2022-03-23 PROCEDURE — 84703 CHORIONIC GONADOTROPIN ASSAY: CPT

## 2022-03-23 PROCEDURE — 93458 L HRT ARTERY/VENTRICLE ANGIO: CPT

## 2022-03-23 PROCEDURE — 87081 CULTURE SCREEN ONLY: CPT

## 2022-03-23 PROCEDURE — 36415 COLL VENOUS BLD VENIPUNCTURE: CPT

## 2022-03-23 PROCEDURE — 85027 COMPLETE CBC AUTOMATED: CPT

## 2022-03-23 PROCEDURE — 93005 ELECTROCARDIOGRAM TRACING: CPT

## 2022-03-23 PROCEDURE — 80061 LIPID PANEL: CPT

## 2022-03-23 PROCEDURE — 85730 THROMBOPLASTIN TIME PARTIAL: CPT

## 2022-03-23 PROCEDURE — 87088 URINE BACTERIA CULTURE: CPT

## 2022-03-23 PROCEDURE — 81000 URINALYSIS NONAUTO W/SCOPE: CPT

## 2022-03-23 PROCEDURE — 85610 PROTHROMBIN TIME: CPT

## 2022-03-23 NOTE — DISCHARGE INST-POST CATH
Discharge Inst-CATH/EP


Problems Reviewed?:  Yes


Post Cardiac Cath/EP D/C Inst


Follow Up/Plan


Appointment with Dr. Ortega's office in 4 weeks


<b>CARDIAC CATH/EP PROCEDURE DISCHARGE INSTRUCTIONS</b>





ACTIVITY





* Go Home directly and rest.


* Limit activity of the leg (or wrist if it was used) for 7 days including 

aerobics, swimming,


   jogging, bicycling, etc.


* Restrict stair-climbing for 7 days if possible, if not, climb up with your 

non-cath leg, then


   bring together on the same step.


* Avoid lifting, pushing, pulling or excessive movement of the affected 

extremity for 7 days.


* Customary sexual activity may be resumed after 2 days-use caution not to use a

position  


   that strains or causes pain to the affected extremity.


* No driving for 24 hours.


* NO SMOKING. 


* Avoid straining for bowel movements for 7 days.


* Gentle walking on level ground is allowed.


* Returning to work will depend on the type of procedure and the results. Your 

doctor will discuss


   this with you.





CALL YOUR DOCTOR FOR ANY OF THE FOLLOWING:





*If bleeding from the puncture site occurs- Apply gentle pressure to site with 

clean cloth and call


   your doctor or EMS.


* If a knot or lump forms under the skin, increases in size, or causes pain.


* If bruising appears to be worsening or moving further down your leg instead of

disappearing.


* Temperature above 101 F.





CARE OF YOUR GROIN INCISION;





* Bruising or purple discoloration of the skin near the puncture site is common.


* You may shower only, no bathtub bathing for 5 days.  Be careful to avoid 

slipping as your


   leg may feel stiff.


* If a closure device was used on your femoral artery, please see the attached 

guide regarding


   care of the device and your leg.


* Leave dressing on FOR 24 hours.





CARE OF YOUR WRIST INCISION;





* Bruising or purple discoloration of the skin near the puncture site is common.


* You may shower.


* DO NOT submerge wrist.


* Leave dressing on FOR 24 hours.











LINDA ORTEGA MD              Mar 23, 2022 08:51

## 2022-03-23 NOTE — DIAGNOSTIC IMAGING REPORT
INDICATION: Hypertension. Abnormal stress test.



EXAMINATION: Chest 03/23/2022



FINDINGS: The cardiomediastinal silhouette is unremarkable. The

pulmonary vasculature is within normal limits.



The lungs and pleural spaces are clear.



IMPRESSION: No evidence of an acute cardiopulmonary process. 



Dictated by: 



  Dictated on workstation # [id number]

## 2022-03-23 NOTE — CONSCIOUS SEDATION/ASA
Conscious Sedation Pre-Proced


Time


08:19





ASA Score


3


For ASA 3 and 4: Consider anesthesia and medical clearance. Also, for patients

with a history of failed moderate sedation consider anesthesia.

















Airway 


 


Lungs 


 


Heart 


 


 ASA score


 


 ASA 1: a normal healthy patient


 


 ASA 2:  a patient with a mild systemic disease (mid diabetes, controlled 

hypertension, obesity 


 


x ASA 3:  a patient with a severe systemic disease that limits activity  

(angina, COPD, prior Myocardial infarction)


 


 ASA 4:  a patient with an incapacitating disease that is a constant threat to 

life (CHF, renal failure)


 


 ASA 5:  a moribund patient not expected to survive 24 hrs.  (ruptured aneurysm)


 


 ASA 6:  a declared brain-dead patient whose organs are being harvested.


 


 For emergent operations, add the letter E after the classification











Mallampati Classification


Grade 3





Sedation Plan


Analgesia, Amnesia, Plan communicated to team members, Discussed options with 

patient/fam, Discussed risks with patient/fam


The patient is an appropriate candidate to undergo the planned procedure, 

sedation, and anesthesia.





The patient immediately re-assessed prior to indication.











LINDA BLANCHARD MD              Mar 23, 2022 08:19

## 2022-03-23 NOTE — CARDIAC CATH REPORT
Cardiac Cath Report


Physician (s)/Assistant (s)


Physician


LINDA BLANCHARD MD





Pre-Procedure Diagnosis


Pre-Procedure Diagnosis:  Coronary artery disease





Post-Procedure Note


Procedure Start Date:  Mar 23, 2022


Name of Procedure:  


Cardiac catheterization


Findings/Procedure Note


PROCEDURE NOTE:


51 years old lady with history of hypertension, hyperlipidemia, had abnormal 

stress test with anterior wall ischemia, scheduled for cardiac catheterization 

possible PTCA.


After explaining the procedure to the patient, all pros and cons were explained,

all questions were answered.  The patient signed the consent and then she  was 

placed on the cardiac catheterization laboratory. Groin was prepped SL fashion 

local anesthesia was used. Sheath placed in the right radial artery, [place] 

catheter was prolapsed to the left ventricular cavity, pressure was measured, 

pullback LV to aorta was done, engaged the right and left coronary system, 

angiogram was done.


At the end of the procedure the sheath was removed.  Vascular band was used





FINDINGS:





Hemodynamics 


/17, end-diastolic pressure of 17


Aorta 93/67 mean of 78





ANATOMY:


Left Main is free of obstructive disease


Left Anterior Descending is moderate in size with 40 to 50% stenosis at the mid 

LAD nonobstructive disease


Left Circumflex is a very small artery with no obstructive disease


Right Coronary Artery is large dominant artery with no obstructive disease


LV Gram was not done, pressure was measured





CONCLUSION:


1.  40 to 50% stenosis in the mid LAD nonobstructive disease


2.  Otherwise nonobstructive disease in the coronary system with large dominant 

right coronary artery


3.  Mildly elevated left ventricular end-diastolic pressure





DISCUSSION AND RECOMMENDATION:


Continue to maximize medical therapy, no intervention is required


Patient was noted to have hematuria and leukocytosis, reported that she has 

chronic hematuria and leukocytosis.  I gave her 1 g of Rocephin and discharged 

on nitrofurantoin 100 mg every 6 hours for 5 days


Anesthesia Type:  Conscious Sedation


Estimated blood loss (mL):  10 ml


Contrast Amount:  35 ml


Total Radiation Dose:  398 mGy





Post-Procedure Diagnosis


Post-operative diagnosis:  


Chest pain


Coronary artery disease


Hypertension


Hyperlipidemia











LINDA BLANCHARD MD              Mar 23, 2022 08:56

## 2022-04-20 ENCOUNTER — HOSPITAL ENCOUNTER (OUTPATIENT)
Dept: HOSPITAL 75 - PREOP | Age: 52
Discharge: HOME | End: 2022-04-20
Attending: SURGERY
Payer: COMMERCIAL

## 2022-04-20 VITALS — BODY MASS INDEX: 37.31 KG/M2 | HEIGHT: 60 IN | WEIGHT: 190.04 LBS

## 2022-04-20 DIAGNOSIS — Z01.818: Primary | ICD-10-CM

## 2022-04-20 NOTE — HISTORY AND PHYSICAL
DATE OF SERVICE:  



ATTENDING PRIMARY CARE PHYSICIAN:

BEVERLY Johnston.



HISTORY OF PRESENT ILLNESS:

The patient is a 51-year-old female, who is known to us.  She does have a

multitude of medical issues.  She has been seen for different gastrointestinal

histories in the past.  She was seen in 2019 for worsening reflux.  On

10/24/2018, she underwent an EGD with biopsies and was found to have a reflux

esophagitis stage II, small hiatal hernia that was 2 cm in size as well as a

mild gastritis and duodenitis.  Biopsies were negative for H. pylori as well as

negative for Jacobs's esophagus.  She then underwent a HIDA scan, which did

show an ejection fraction of 54%.  However, she did have reproduction of

symptoms and this was consistent with a biliary dyskinesia.  On 10/31/2018, she

underwent a laparoscopic cholecystectomy.  She was found to have hepatomegaly as

well as distended gallbladder with thick mesentery.  She was then seen again in

 for a symptomatic lesion of the superior left inner thigh.  She did undergo

excision of the lesion of the left inner thigh that was 6.4 cm in size and

consistent with a lipoma.



On today's visit, she reports that for the last 6 to 8 weeks, she has been

having issues with increased bloating and abdominal pain usually after eating. 

She does have a history of constipation and also has irritable bowel syndrome

and has been on medications in the past; however, due to her insurance, she has

not been able to get these medications filled currently.  She reports that she

also has episodes of reflux despite being on PPI acid reducer.  She denies any

nausea or vomiting as well as no hematemesis or any coffee ground emesis.  She

also reports that she is in need of a screening colonoscopy and has had

colonoscopy before in the past; however, this has been several years.  She

denies any family history of any colon cancer as well as no blood in her stool.



PAST MEDICAL HISTORY:

Type 2 diabetes, lupus, gastroesophageal reflux disease, fibromyalgia, anxiety,

depression, hypertension, IBS, predominantly constipation, history of ulcerative

colitis, upper extremity edema, atrial fibrillation, adrenal insufficiency,

chronic pain, hypercholesterolemia, and hypothyroidism.



PAST SURGICAL HISTORY:

Cranial surgery for cervical dysplasia, tubal ligation in ,  section

x2 in  and , complete hysterectomy in , C5-C7 spinal fusion and

diskectomy in , bilateral carpal tunnel release in , laparoscopic

cholecystectomy 10/31/2018, and excision of left inner thigh lipoma, .



ALLERGIES:

No known drug allergies.



MEDICATIONS:

CoQ10 100 mg daily, Trulance 3 mg daily, Xtampza ER 13.5 mg q.12 hours,

aripiprazole 10 mg b.i.d., brimonidine tartrate ophthalmic solution, lisinopril

5 mg b.i.d., metoprolol ER 50 mg daily, omeprazole 40 mg daily,  Kombiglyze XR

2.5/100 mg daily, orphenadrine  mg two tablets daily, rosuvastatin 10 mg

daily, [place] Thyroid 60 mg b.i.d., buspirone 30 mg, clonidine 0.1 mg b.i.d.,

amphetamine/dextroamphetamine 20 mg half to one tablet by mouth t.i.d.,

duloxetine 60 mg daily, fluoxetine 40 mg daily, hydrochlorothiazide 25 mg 1 to 2

tablets daily, spironolactone 100 mg 1 to 2 tablets daily, Wegovy 2.4 mg weekly,

Eliquis 5 mg b.i.d., Dayvigo 5 mg at bedtime p.r.n., potassium chloride ER 10

mEq two capsules every other day, prednisone 1 mg four tablets by mouth t.i.d.,

hydrocortisone 20 mg one tablet by mouth q.6 hours p.r.n., and Solu-Cortef 100

mg take 200 mg IM daily as directed.



SOCIAL HISTORY:

Positive for tobacco smoke 4 to 5 cigarettes per day for 30 years.  Rare for

alcohol.



FAMILY HISTORY:

Paternal grandmother, diabetes, hypertension.  Maternal grandmother, breast

cancer, hypertension.  Father, myocardial infarction.  Mother, hypertension. 

Paternal grandfather, hypertension.  Maternal grandfather, hypertension,

myocardial infarction.  Siblings, hypertension, myocardial infarction.



REVIEW OF SYSTEMS:

This is a well-nourished female in no acute distress.  She is not experiencing

any shortness of breath or difficulty breathing.  No chest pain, palpitations or

diaphoresis.  No nausea, vomiting or abdominal pain.  No diarrhea or

constipation.  She does report episodes of heartburn and reflux.  She also

reports alternating episodes of diarrhea and constipation.  No red blood per

rectum.  No dark tarry stools.  No fever or chills.  No recent inadvertent

weight loss.  All other review of systems negative.



PHYSICAL EXAMINATION:

VITAL SIGNS:  Blood pressure is 108/73.  Current weight is 196 pounds at 5 feet

0 inches.

CHEST:  Clear.  Good breath sounds bilaterally.

HEART:  Regular, no murmurs.

EXTREMITIES:  No lower extremity edema.  Negative Homans sign.

HEENT:  No scleral icterus.

NECK:  No cervical lymphadenopathy.

ABDOMEN:  Soft, nontender, and nondistended.

SKIN:  Warm, dry, and pink.

NEUROLOGIC:  Awake, alert and oriented x3.



ASSESSMENT AND PLAN:

A 51-year-old female, who is in need of a screening colonoscopy as well as

symptomatic reflux despite being on a PPI acid reducer.  At this time, we will

proceed with scheduling her for an EGD and a screening colonoscopy.  The risks

and benefits of the procedure as well as the procedure and home care

instructions were explained to the patient.  She verbalized understanding of

instructions and agrees to proceed as planned.  At this time, we will schedule

her for an EGD with biopsies as appropriate as well as a screening colonoscopy.





Job ID: [id number]

DocumentID: [id number]

Dictated Date:  04/15/2022 11:18:14

Transcription Date: 04/15/2022 12:11:31

Dictated By: BEVERLY FRANCES

## 2022-04-27 ENCOUNTER — HOSPITAL ENCOUNTER (OUTPATIENT)
Dept: HOSPITAL 75 - ENDO | Age: 52
Discharge: HOME | End: 2022-04-27
Attending: SURGERY
Payer: COMMERCIAL

## 2022-04-27 VITALS — SYSTOLIC BLOOD PRESSURE: 90 MMHG | DIASTOLIC BLOOD PRESSURE: 51 MMHG

## 2022-04-27 VITALS — SYSTOLIC BLOOD PRESSURE: 102 MMHG | DIASTOLIC BLOOD PRESSURE: 60 MMHG

## 2022-04-27 VITALS — HEIGHT: 59.84 IN | WEIGHT: 190.04 LBS | BODY MASS INDEX: 37.31 KG/M2

## 2022-04-27 VITALS — DIASTOLIC BLOOD PRESSURE: 77 MMHG | SYSTOLIC BLOOD PRESSURE: 122 MMHG

## 2022-04-27 DIAGNOSIS — K62.89: ICD-10-CM

## 2022-04-27 DIAGNOSIS — K64.1: ICD-10-CM

## 2022-04-27 DIAGNOSIS — Z12.11: Primary | ICD-10-CM

## 2022-04-27 DIAGNOSIS — K21.00: ICD-10-CM

## 2022-04-27 DIAGNOSIS — K58.1: ICD-10-CM

## 2022-04-27 DIAGNOSIS — F17.210: ICD-10-CM

## 2022-04-27 DIAGNOSIS — K44.9: ICD-10-CM

## 2022-04-27 DIAGNOSIS — K64.4: ICD-10-CM

## 2022-04-27 DIAGNOSIS — Z79.899: ICD-10-CM

## 2022-04-27 PROCEDURE — 82947 ASSAY GLUCOSE BLOOD QUANT: CPT

## 2022-04-27 NOTE — DISCHARGE INST-SURGICAL
D/C Lap Instructions-KIDO


New, Converted, or Re-Newed RX:  RX on Chart


Follow Up 





Activity as tolerated








High Fiber Diet 25g or more per day





Avoid Alcohol, Caffeine, Spicy Greasy and Acid foods.





Drink 64 fluid oz or more of fluids per day.





Symptoms to Report: Fever over 101 degree F, Nausea/Vomiting 


If any problems/questions: Contact your physician or go to Emergency Room











JOELLE ROSE MD                Apr 27, 2022 09:50

## 2022-04-27 NOTE — OPERATIVE REPORT
DATE OF SERVICE:  04/27/2022



ATTENDING PRIMARY CARE CLINICIAN:

BEVERLY Johnston.



PREOPERATIVE DIAGNOSES:

Gastroesophageal reflux disease and screening colonoscopy.



POSTOPERATIVE DIAGNOSES:

Reflux esophagitis, [place] class C, small hiatal hernia approximately 2.5

cm in size, moderate severity gastritis, chronic stage II external and internal

hemorrhoids, mild chronic inflammatory changes of the rectum and descending

colon.  No ulcerations.



PROCEDURES PERFORMED:

EGD with biopsy and colonoscopy with biopsy.



SURGEON:

Joelle Rose MD.



ANESTHESIA:

Monitored anesthesia care.



ESTIMATED BLOOD LOSS:

Minimal.



FINDINGS:

Reflux esophagitis, [place] class C, small hiatal hernia approximately 2.5

cm in size, moderate severity gastritis, chronic stage II external and internal

hemorrhoids, mild chronic inflammatory changes of the rectum and descending

colon.  No ulcerations.



DISPOSITION:

The patient tolerated the procedure well.



INDICATIONS FOR PROCEDURE:

The patient is a 51-year-old female with multiple medical problems.  She has a

history of fibromyalgia, diabetes, lupus, irritable bowel syndrome as well as

history of ulcerative colitis, atrial fibrillation, and adrenal insufficiency

and she is on multitude of different medications.  She states that for the past

eight weeks, she has had abdominal bloating and epigastric pain after eating

meals.  She also does have a history of constipation and irritable bowel

syndrome; however, also mentions a diagnosis of ulcerative colitis in the past

as well.  She is currently on a PPI acid reducer with omeprazole 40 mg daily. 

She is also in need of a screening colonoscopy.  She has not had a colonoscopy

up to this point in her life.



DESCRIPTION OF PROCEDURE:

The patient was brought to the endoscopy suite and laid in the left lateral

decubitus position.  After adequate IV pain and sedative medications and

monitored anesthesia care, the mouthpiece was applied.



The endoscope was placed in the mouth, visualizing the pharynx and

hypopharyngeal region.  Vocal cords, epiglottis, and vallecula were identified

and appeared to be normal.  The endoscope was then gently intubated, the

esophageal opening and esophagus insufflated.  The endoscope was then advanced

to the first, second and third portion of esophagus.  At the level of GE

junction, a reflux esophagitis, [place] grade C was identified.  A biopsy

was taken with forceps with visualization of good hemostasis.



The endoscope was then advanced in the stomach and endoscope retroflexed,

visualizing a small hiatal hernia approximately 2.5 cm in size.  There was a

moderate gastritis, which was diffuse throughout the stomach.  No formal

ulcerations.  A biopsy was taken of the antrum to rule out H. pylori with

visualization of good hemostasis.  The endoscope was then advanced to the

pylorus and first and second portions of the duodenum, which appeared normal. 

The endoscope was then slowly withdrawn while taking a second look and

suctioning of residual air with no additional findings.



A digital rectal examination was performed, which revealed chronic stage II

external and internal hemorrhoids, not actively edematous nor inflamed and no

bleeding.  Normal sphincter tone was felt and there were no palpable masses.



The endoscope was then intubated into the anus and rectum gently insufflated. 

There was a poor colonic preparation.  The endoscope was then advanced to the

valves of Gardner of the rectum as well as through the sigmoid, descending

colon, where it appeared that a mild chronic inflammation was noted and a biopsy

was taken of the rectum as well as the descending colon with forceps with

visualization of good hemostasis.  There were no ulcerative plaques identified. 

The endoscope was then advanced through the transverse, ascending colon to the

cecum, which were normal.  The endoscope was then slowly withdrawn while taking

a second look and suctioning of residual air with no additional findings.



The patient tolerated the procedure well.  She does have a significant gastritis

and reflux esophagitis, which may be related to dietary and lifestyle.  We will

recommend the necessary lifestyle changes including small and more frequent

meals, avoidance of eating at night as well as head elevation while lying

supine.  She also needs to avoid caffeinated beverages, spicy, greasy and acidic

foods; however, the acid production may also be related to her steroid use for

her adrenal insufficiency.  We will start her on Protonix 40 mg daily to be

taken in addition to her omeprazole to be taken at a separate time during the

day.  We will also recommend a high fiber diet with incorporation of a fiber

supplement, which should equal or exceed 25 grams daily as well as significant

amounts of water to promote soft stools on a daily basis.  If she is

asymptomatic, she does not need another colonoscopy for another 10 years.





Job ID: [id number]

DocumentID: [id number]

Dictated Date:  04/27/2022 10:42:24

Transcription Date: 04/27/2022 16:26:15

Dictated By: JOELLE ROSE MD

## 2022-04-27 NOTE — PROGRESS NOTE-PRE OPERATIVE
Pre-Operative Progress Note


H&P Reviewed


The H&P was reviewed, patient examined and no changes noted.


Date Seen by Provider:  Apr 27, 2022


Time Seen by Provider:  09:30


Date H&P Reviewed:  Apr 27, 2022


Time H&P Reviewed:  09:30


Pre-Operative Diagnosis:  GERD, screening colo











JOELLE ROSE MD                Apr 27, 2022 09:49

## 2022-04-27 NOTE — ANESTHESIA-GENERAL POST-OP
MAC


Patient Condition


Mental Status/LOC:  Same as Preop


Cardiovascular:  Satisfactory


Nausea/Vomiting:  Absent


Respiratory:  Satisfactory


Pain:  Controlled


Complications:  Absent





Post Op Complications


Complications


None





Follow Up Care/Instructions


Patient Instructions


None needed.





Anesthesiology Discharge Order


Discharge Order


Patient is doing well, no complaints, stable vital signs, no apparent adverse 

anesthesia problems.











TADEO BERMAN DO         Apr 27, 2022 10:44

## 2022-04-27 NOTE — PROGRESS NOTE-POST OPERATIVE
Post-Operative Progess Note


Surgeon (s)/Assistant (s)


Surgeon


JOELLE ROSE MD


Assistant:  none





Pre-Operative Diagnosis


GERD, screening colo





Post-Operative Diagnosis





reflux esophagitis(stage 3), small HH(2.5cm), moderate gastritis.


mild chronic stage 2 ext and int hemorrhoids, mild chronic inflammation


desc colon and rectum.





Procedure & Operative Findings


Date of Procedure


4/27/22


Procedure Performed/Findings


EGD with bx.


Colonoscopy with bx.


Anesthesia Type


mac





Estimated Blood Loss


Estimated blood loss (mL):  minimal





Specimens/Packing


Specimens Removed


ge jxn, antrum, desc colon, rectum











JOELLE ROSE MD                Apr 27, 2022 10:56

## 2022-09-19 NOTE — DIAGNOSTIC IMAGING REPORT
INDICATION: 

Lump in the left groin.



TECHNIQUE: 

Sonographic interrogation of the area of lump in the left groin

was performed.



FINDINGS:

No sonographic abnormality is seen. No solid or cystic mass is

detected.



IMPRESSION: 

No sonographic abnormality is detected.



Dictated by: 



  Dictated on workstation # [id number] Consent (Nose)/Introductory Paragraph: The rationale for Mohs was explained to the patient and consent was obtained. The risks, benefits and alternatives to therapy were discussed in detail. Specifically, the risks of nasal deformity, changes in the flow of air through the nose, infection, scarring, bleeding, prolonged wound healing, incomplete removal, allergy to anesthesia, nerve injury and recurrence were addressed. Prior to the procedure, the treatment site was clearly identified and confirmed by the patient. All components of Universal Protocol/PAUSE Rule completed.

## 2023-03-30 ENCOUNTER — HOSPITAL ENCOUNTER (OUTPATIENT)
Dept: HOSPITAL 75 - RAD | Age: 53
End: 2023-03-30
Attending: NURSE PRACTITIONER
Payer: COMMERCIAL

## 2023-03-30 DIAGNOSIS — N62: Primary | ICD-10-CM

## 2023-03-30 PROCEDURE — 77066 DX MAMMO INCL CAD BI: CPT

## 2023-03-30 PROCEDURE — 77062 BREAST TOMOSYNTHESIS BI: CPT

## 2023-03-30 NOTE — DIAGNOSTIC IMAGING REPORT
INDICATION: Follow-up left breast density.



COMPARISON: Prior mammogram from 11/30/2021.



EXAMINATION: 2D and 3D bilateral diagnostic mammography was

performed with CAD.



FINDINGS: Scattered fibroglandular densities are identified,

bilaterally. Tiny nodule previously noted in the left breast is

similar to perhaps slightly smaller when compared with prior

exam. No new mass is detected. No malignant-appearing

microcalcifications are seen. Axillae are unremarkable.



IMPRESSION: No mammographic feature suspicious for malignancy is

identified. 



Dictated by: 



  Dictated on workstation # [id number]

## 2023-09-05 ENCOUNTER — HOSPITAL ENCOUNTER (OUTPATIENT)
Dept: HOSPITAL 75 - LAB | Age: 53
End: 2023-09-05
Attending: INTERNAL MEDICINE
Payer: COMMERCIAL

## 2023-09-05 DIAGNOSIS — R60.0: Primary | ICD-10-CM

## 2023-09-05 DIAGNOSIS — E78.2: ICD-10-CM

## 2023-09-05 LAB
ALBUMIN SERPL-MCNC: 3.7 GM/DL (ref 3.2–4.5)
ALP SERPL-CCNC: 43 U/L (ref 40–136)
ALT SERPL-CCNC: 23 U/L (ref 0–55)
BILIRUB SERPL-MCNC: 0.3 MG/DL (ref 0.1–1)
BUN/CREAT SERPL: 13
CALCIUM SERPL-MCNC: 9 MG/DL (ref 8.5–10.1)
CHLORIDE SERPL-SCNC: 100 MMOL/L (ref 98–107)
CHOLEST SERPL-MCNC: 149 MG/DL (ref ?–200)
CO2 SERPL-SCNC: 26 MMOL/L (ref 21–32)
CREAT SERPL-MCNC: 0.77 MG/DL (ref 0.6–1.3)
GFR SERPLBLD BASED ON 1.73 SQ M-ARVRAT: 93 ML/MIN
GLUCOSE SERPL-MCNC: 121 MG/DL (ref 70–105)
HDLC SERPL-MCNC: 57 MG/DL (ref 40–60)
POTASSIUM SERPL-SCNC: 4.1 MMOL/L (ref 3.6–5)
PROT SERPL-MCNC: 6.5 GM/DL (ref 6.4–8.2)
SODIUM SERPL-SCNC: 138 MMOL/L (ref 135–145)
TRIGL SERPL-MCNC: 115 MG/DL (ref ?–150)
VLDLC SERPL CALC-MCNC: 23 MG/DL (ref 5–40)

## 2023-09-05 PROCEDURE — 36415 COLL VENOUS BLD VENIPUNCTURE: CPT

## 2023-09-05 PROCEDURE — 80061 LIPID PANEL: CPT

## 2023-09-05 PROCEDURE — 80053 COMPREHEN METABOLIC PANEL: CPT

## 2023-10-20 ENCOUNTER — HOSPITAL ENCOUNTER (OUTPATIENT)
Dept: HOSPITAL 75 - CARD | Age: 53
End: 2023-10-20
Attending: PHYSICIAN ASSISTANT
Payer: COMMERCIAL

## 2023-10-20 DIAGNOSIS — I10: Primary | ICD-10-CM

## 2023-10-20 PROCEDURE — 93306 TTE W/DOPPLER COMPLETE: CPT
